# Patient Record
Sex: FEMALE | Race: WHITE | NOT HISPANIC OR LATINO | Employment: FULL TIME | URBAN - METROPOLITAN AREA
[De-identification: names, ages, dates, MRNs, and addresses within clinical notes are randomized per-mention and may not be internally consistent; named-entity substitution may affect disease eponyms.]

---

## 2020-02-13 ENCOUNTER — LAB (OUTPATIENT)
Dept: LAB | Facility: HOSPITAL | Age: 30
End: 2020-02-13

## 2020-02-13 ENCOUNTER — OFFICE VISIT (OUTPATIENT)
Dept: INTERNAL MEDICINE | Facility: CLINIC | Age: 30
End: 2020-02-13

## 2020-02-13 VITALS
SYSTOLIC BLOOD PRESSURE: 140 MMHG | OXYGEN SATURATION: 98 % | RESPIRATION RATE: 16 BRPM | HEART RATE: 87 BPM | WEIGHT: 253.8 LBS | BODY MASS INDEX: 40.79 KG/M2 | TEMPERATURE: 98.4 F | HEIGHT: 66 IN | DIASTOLIC BLOOD PRESSURE: 84 MMHG

## 2020-02-13 DIAGNOSIS — Z00.00 HEALTHCARE MAINTENANCE: ICD-10-CM

## 2020-02-13 DIAGNOSIS — Z13.1 SCREENING FOR DIABETES MELLITUS: ICD-10-CM

## 2020-02-13 DIAGNOSIS — Z13.220 SCREENING, LIPID: ICD-10-CM

## 2020-02-13 DIAGNOSIS — R05.3 CHRONIC COUGH: ICD-10-CM

## 2020-02-13 DIAGNOSIS — F41.9 ANXIETY: Primary | ICD-10-CM

## 2020-02-13 DIAGNOSIS — F33.1 MODERATE EPISODE OF RECURRENT MAJOR DEPRESSIVE DISORDER (HCC): ICD-10-CM

## 2020-02-13 DIAGNOSIS — N64.52 BREAST DISCHARGE: ICD-10-CM

## 2020-02-13 DIAGNOSIS — Z23 NEED FOR VACCINATION: ICD-10-CM

## 2020-02-13 PROBLEM — R63.4 DECREASED BODY WEIGHT: Status: ACTIVE | Noted: 2020-02-13

## 2020-02-13 PROBLEM — IMO0001 ASIAN FLU: Status: ACTIVE | Noted: 2020-02-13

## 2020-02-13 PROBLEM — R63.4 DECREASED BODY WEIGHT: Status: RESOLVED | Noted: 2020-02-13 | Resolved: 2020-02-13

## 2020-02-13 PROCEDURE — 90472 IMMUNIZATION ADMIN EACH ADD: CPT | Performed by: INTERNAL MEDICINE

## 2020-02-13 PROCEDURE — 84146 ASSAY OF PROLACTIN: CPT | Performed by: INTERNAL MEDICINE

## 2020-02-13 PROCEDURE — 85027 COMPLETE CBC AUTOMATED: CPT | Performed by: INTERNAL MEDICINE

## 2020-02-13 PROCEDURE — 83036 HEMOGLOBIN GLYCOSYLATED A1C: CPT | Performed by: INTERNAL MEDICINE

## 2020-02-13 PROCEDURE — 99204 OFFICE O/P NEW MOD 45 MIN: CPT | Performed by: INTERNAL MEDICINE

## 2020-02-13 PROCEDURE — 80061 LIPID PANEL: CPT | Performed by: INTERNAL MEDICINE

## 2020-02-13 PROCEDURE — 90674 CCIIV4 VAC NO PRSV 0.5 ML IM: CPT | Performed by: INTERNAL MEDICINE

## 2020-02-13 PROCEDURE — 90715 TDAP VACCINE 7 YRS/> IM: CPT | Performed by: INTERNAL MEDICINE

## 2020-02-13 PROCEDURE — 80053 COMPREHEN METABOLIC PANEL: CPT | Performed by: INTERNAL MEDICINE

## 2020-02-13 PROCEDURE — 90471 IMMUNIZATION ADMIN: CPT | Performed by: INTERNAL MEDICINE

## 2020-02-13 PROCEDURE — 84443 ASSAY THYROID STIM HORMONE: CPT | Performed by: INTERNAL MEDICINE

## 2020-02-13 RX ORDER — BUPROPION HYDROCHLORIDE 150 MG/1
150 TABLET ORAL DAILY
Qty: 30 TABLET | Refills: 1 | Status: SHIPPED | OUTPATIENT
Start: 2020-02-13 | End: 2020-06-25 | Stop reason: SINTOL

## 2020-02-13 RX ORDER — DULOXETIN HYDROCHLORIDE 60 MG/1
60 CAPSULE, DELAYED RELEASE ORAL DAILY
Qty: 30 CAPSULE | Refills: 2 | Status: SHIPPED | OUTPATIENT
Start: 2020-02-13 | End: 2020-06-25 | Stop reason: SDUPTHER

## 2020-02-13 RX ORDER — DULOXETIN HYDROCHLORIDE 60 MG/1
60 CAPSULE, DELAYED RELEASE ORAL DAILY
COMMUNITY
End: 2020-02-13 | Stop reason: SDUPTHER

## 2020-02-13 NOTE — PROGRESS NOTES
Internal Medicine New Patient  Arminda Alexis is a 29 y.o. female who presents today to establish care and with concerns as outlined below.    Chief Complaint  Chief Complaint   Patient presents with   • Establish Care     Lft breast leaking white/yellow fluid, in shower   • Depression   • Cough     about a month        HPI  Ms. Alexis comes in today to establish care. She has not had a recent PCP but had been seeing Jamie Gannon at [facility] and Dr. Cates at [facility]. Last was seen there in 2017.    She notes 1 month of cough. The cough is described as dry and hacking. She does not note anything that brings it on. It is not exertional. She worries that she has bronchitis. No SOA.     She has been on duloxetine 60mg daily that she takes for anxiety and depression. She previously took wellbutrin 150mg daily but weaned off of this prior to stopping the duloxetine because it did not help. Also took prozac but this did not work. She also previously had done counseling but lost insurance. She notes that her anxiety manifests mainly as anger.    She notes that her left breast has always been larger but is growing in size and has had leakage of a whitish fluid from her left nipple. She has not noticed lumps, bumps, or breast pain. She did have an ultrasound in 2015 for a lump that was felt to be benign. Her mother had breast cancer that was diagnosed at 50.       Review of Systems  Review of Systems   Constitutional: Negative.    HENT: Positive for congestion.    Eyes: Negative.    Respiratory: Positive for cough. Negative for shortness of breath.    Cardiovascular: Negative.    Gastrointestinal: Positive for diarrhea. Negative for abdominal pain, blood in stool, constipation, nausea and vomiting.   Genitourinary: Positive for breast discharge and breast pain. Negative for breast lump, decreased urine volume, difficulty urinating, dysuria, frequency, hematuria and urgency.   Musculoskeletal: Negative.   "  Skin: Negative.    Neurological: Negative.    Psychiatric/Behavioral: Positive for sleep disturbance and depressed mood. Negative for decreased concentration, self-injury and suicidal ideas. The patient is nervous/anxious.         Past Medical History  Past Medical History:   Diagnosis Date   • Depression    • Headache         Surgical History  Past Surgical History:   Procedure Laterality Date   • TONSILLECTOMY     • WISDOM TOOTH EXTRACTION Bilateral     July 2019        Family History  Family History   Problem Relation Age of Onset   • Cancer Mother    • Diabetes Father    • Diabetes Maternal Grandmother    • Diabetes Maternal Grandfather    • Diabetes Paternal Grandmother    • Diabetes Paternal Grandfather    • Cancer Paternal Grandfather    • Brain cancer Paternal Grandfather         Social History  Social History     Socioeconomic History   • Marital status:      Spouse name: Not on file   • Number of children: Not on file   • Years of education: Not on file   • Highest education level: Not on file   Tobacco Use   • Smoking status: Former Smoker   • Smokeless tobacco: Never Used   • Tobacco comment: 2016 quit   Substance and Sexual Activity   • Alcohol use: Never     Frequency: Never   • Drug use: Never        Current Medications  Current Outpatient Medications on File Prior to Visit   Medication Sig Dispense Refill   • DULoxetine (CYMBALTA) 60 MG capsule Take 60 mg by mouth Daily.       No current facility-administered medications on file prior to visit.        Allergies  No Known Allergies     Objective  Visit Vitals  /84   Pulse 87   Temp 98.4 °F (36.9 °C)   Resp 16   Ht 167.6 cm (65.98\")   Wt 115 kg (253 lb 12.8 oz)   SpO2 98%   BMI 40.98 kg/m²        Physical Exam  Physical Exam   Constitutional: She is oriented to person, place, and time. She appears well-developed and well-nourished. No distress. She is obese.  HENT:   Head: Normocephalic and atraumatic.   Right Ear: External ear normal. "   Left Ear: External ear normal.   Nose: Nose normal.   Mouth/Throat: Posterior oropharyngeal edema (mild) and posterior oropharyngeal erythema present. No oropharyngeal exudate.   Eyes: Pupils are equal, round, and reactive to light. Conjunctivae and EOM are normal. No scleral icterus.   Neck: Neck supple.   Cardiovascular: Normal rate, regular rhythm and normal heart sounds.   No murmur heard.  Pulmonary/Chest: Effort normal and breath sounds normal. No respiratory distress. Right breast exhibits no inverted nipple, no mass, no nipple discharge, no skin change and no tenderness. Left breast exhibits mass (small palpable mass at 8:00 with associated tenderness) and tenderness. Left breast exhibits no inverted nipple, no nipple discharge and no skin change.   Abdominal: Soft. Bowel sounds are normal. She exhibits no distension. There is no tenderness.   Musculoskeletal: She exhibits no edema or deformity.   Lymphadenopathy:     She has no cervical adenopathy.   Neurological: She is alert and oriented to person, place, and time.   Skin: Skin is warm and dry. No rash noted. She is not diaphoretic.   Psychiatric: She has a normal mood and affect. Her behavior is normal. Judgment and thought content normal.   Nursing note and vitals reviewed.     Results  No results found for this or any previous visit.     Assessment and Plan  Arminda was seen today for establish care, depression and cough.    Diagnoses and all orders for this visit:    Anxiety and moderate episode of MDD  - On cymbalta 60mg daily for the last year with ongoing issues with anxiety, irritability, depression.  - Discussed options of adding wellbutrin to cymbalta or changing to another SSRI, she has had some relief with cymbalta so opted for addition of wellbutrin. Will refill cymbalta 60mg daily and add wellbutrin 150mg daily. Discussed common side effects and that full effect may not be noted for 4-6 weeks. She is to notify me if she develops side  effects.  - Also recommended counseling and provided her a handout of local providers.  - Will reevaluate in 4 weeks.    Breast discharge  - She reports unilateral left breast discharge. Has breast tenderness and possible small mass versus fibroglandular tissue on exam. No expressed discharge.  - She does have a family history of breast cancer in her mother at age 50.  - Will check prolactin, TSH, renal function, and obtain breast ultrasound.  - If above workup negative it may be due to duloxetine which was discussed with her today.    Chronic cough  - Suspect post nasal drainage, recommend flonase.  - No SOA, hypoxia, or abnormal lung sounds on exam, also clinically not consistent with asthma as it is not exertional.    Healthcare maintenance  - CBC and CMP ordered    Screening, lipid  - Lipid panel ordered    Screening for diabetes mellitus  - A1c ordered    Health Maintenance   Topic Date Due   • ANNUAL PHYSICAL  05/05/1993   • TDAP/TD VACCINES (1 - Tdap) 05/05/2001   • INFLUENZA VACCINE  08/01/2019   • PAP SMEAR  02/13/2020     Health Maintenance  - Pap smear: Last at [facility] in 2017, normal.  - Mammogram: Start screening at age 40.  - Colonoscopy: Start screening at age 50.  - Immunizations: Flu and Tdap today.  - Depression screening: PHQ9 = 11 2/2020 addressed above.    Return in about 4 weeks (around 3/12/2020) for Follow up, Labs today.

## 2020-02-14 LAB
ALBUMIN SERPL-MCNC: 4.5 G/DL (ref 3.5–5.2)
ALBUMIN/GLOB SERPL: 1.3 G/DL
ALP SERPL-CCNC: 58 U/L (ref 39–117)
ALT SERPL W P-5'-P-CCNC: 30 U/L (ref 1–33)
ANION GAP SERPL CALCULATED.3IONS-SCNC: 12.1 MMOL/L (ref 5–15)
AST SERPL-CCNC: 24 U/L (ref 1–32)
BILIRUB SERPL-MCNC: 0.3 MG/DL (ref 0.2–1.2)
BUN BLD-MCNC: 11 MG/DL (ref 6–20)
BUN/CREAT SERPL: 16.2 (ref 7–25)
CALCIUM SPEC-SCNC: 9.8 MG/DL (ref 8.6–10.5)
CHLORIDE SERPL-SCNC: 100 MMOL/L (ref 98–107)
CHOLEST SERPL-MCNC: 202 MG/DL (ref 0–200)
CO2 SERPL-SCNC: 24.9 MMOL/L (ref 22–29)
CREAT BLD-MCNC: 0.68 MG/DL (ref 0.57–1)
DEPRECATED RDW RBC AUTO: 43.8 FL (ref 37–54)
ERYTHROCYTE [DISTWIDTH] IN BLOOD BY AUTOMATED COUNT: 14.6 % (ref 12.3–15.4)
GFR SERPL CREATININE-BSD FRML MDRD: 102 ML/MIN/1.73
GLOBULIN UR ELPH-MCNC: 3.4 GM/DL
GLUCOSE BLD-MCNC: 81 MG/DL (ref 65–99)
HBA1C MFR BLD: 5.6 % (ref 4.8–5.6)
HCT VFR BLD AUTO: 43.6 % (ref 34–46.6)
HDLC SERPL-MCNC: 41 MG/DL (ref 40–60)
HGB BLD-MCNC: 14.7 G/DL (ref 12–15.9)
LDLC SERPL CALC-MCNC: 93 MG/DL (ref 0–100)
LDLC/HDLC SERPL: 2.26 {RATIO}
MCH RBC QN AUTO: 27.8 PG (ref 26.6–33)
MCHC RBC AUTO-ENTMCNC: 33.7 G/DL (ref 31.5–35.7)
MCV RBC AUTO: 82.6 FL (ref 79–97)
PLATELET # BLD AUTO: 303 10*3/MM3 (ref 140–450)
PMV BLD AUTO: 10.3 FL (ref 6–12)
POTASSIUM BLD-SCNC: 4.3 MMOL/L (ref 3.5–5.2)
PROLACTIN SERPL-MCNC: 8.8 NG/ML (ref 4.79–23.3)
PROT SERPL-MCNC: 7.9 G/DL (ref 6–8.5)
RBC # BLD AUTO: 5.28 10*6/MM3 (ref 3.77–5.28)
SODIUM BLD-SCNC: 137 MMOL/L (ref 136–145)
TRIGL SERPL-MCNC: 341 MG/DL (ref 0–150)
TSH SERPL DL<=0.05 MIU/L-ACNC: 2.21 UIU/ML (ref 0.27–4.2)
VLDLC SERPL-MCNC: 68.2 MG/DL (ref 5–40)
WBC NRBC COR # BLD: 10.97 10*3/MM3 (ref 3.4–10.8)

## 2020-02-21 ENCOUNTER — TELEPHONE (OUTPATIENT)
Dept: INTERNAL MEDICINE | Facility: CLINIC | Age: 30
End: 2020-02-21

## 2020-02-21 ENCOUNTER — APPOINTMENT (OUTPATIENT)
Dept: ULTRASOUND IMAGING | Facility: HOSPITAL | Age: 30
End: 2020-02-21

## 2020-02-24 DIAGNOSIS — N60.42 DUCT ECTASIA OF BREAST, LEFT: Primary | ICD-10-CM

## 2020-02-24 DIAGNOSIS — N64.52 BREAST DISCHARGE: ICD-10-CM

## 2020-02-24 NOTE — TELEPHONE ENCOUNTER
I think the orders need to be changed to [facility] and not [facility]?  
Meli, please let me know if there is any issue with getting this set up at [facility]. Thank you!  
PT CALLED BACK AND STATED SHE  ALREADY HAD THIS DONE AT [facility] AND THE RESULTS ARE TO BE COMING OVER TO THE OFFICE.   
PT HAS AN ORDER FOR AN ULTRASOUND, IT WAS SENT TO [facility] BREAST [place],BUT PT WOULD LIKE IT SENT TO [facility]TR[facility], -[phone]  
Yes

## 2020-06-25 ENCOUNTER — OFFICE VISIT (OUTPATIENT)
Dept: INTERNAL MEDICINE | Facility: CLINIC | Age: 30
End: 2020-06-25

## 2020-06-25 VITALS
RESPIRATION RATE: 16 BRPM | SYSTOLIC BLOOD PRESSURE: 138 MMHG | BODY MASS INDEX: 38.63 KG/M2 | HEART RATE: 55 BPM | TEMPERATURE: 98.6 F | DIASTOLIC BLOOD PRESSURE: 72 MMHG | HEIGHT: 66 IN | WEIGHT: 240.4 LBS | OXYGEN SATURATION: 97 %

## 2020-06-25 DIAGNOSIS — F33.1 MODERATE EPISODE OF RECURRENT MAJOR DEPRESSIVE DISORDER (HCC): ICD-10-CM

## 2020-06-25 DIAGNOSIS — F41.9 ANXIETY: Primary | ICD-10-CM

## 2020-06-25 DIAGNOSIS — N60.42 DUCT ECTASIA OF BREAST, LEFT: ICD-10-CM

## 2020-06-25 PROBLEM — R05.3 CHRONIC COUGH: Status: RESOLVED | Noted: 2020-02-13 | Resolved: 2020-06-25

## 2020-06-25 PROCEDURE — 99214 OFFICE O/P EST MOD 30 MIN: CPT | Performed by: INTERNAL MEDICINE

## 2020-06-25 RX ORDER — HYDROXYZINE PAMOATE 25 MG/1
25 CAPSULE ORAL 3 TIMES DAILY PRN
Qty: 30 CAPSULE | Refills: 1 | Status: SHIPPED | OUTPATIENT
Start: 2020-06-25 | End: 2021-01-05

## 2020-06-25 RX ORDER — DULOXETIN HYDROCHLORIDE 30 MG/1
90 CAPSULE, DELAYED RELEASE ORAL DAILY
Qty: 90 CAPSULE | Refills: 1 | Status: SHIPPED | OUTPATIENT
Start: 2020-06-25 | End: 2020-08-21

## 2020-06-25 NOTE — PROGRESS NOTES
Internal Medicine Acute Visit    Chief Complaint   Patient presents with   • Depression     Medication changes,depression worse,family issues        HPI  Ms. Alexis comes in today due to worsening depression. She did not tolerate wellbutrin due to increase in anger which had happened previously when she took wellbutrin. She recently has been having relationship issues with her . She has been having panic attacks associated with palpitations and decreased appetite. She was unable to sleep last night after an argument. At other times she is oversleeping. She did have thoughts once that everything would be better without her. She feels worthless. She did not have active SI, no plan. She has tried exercise, diet, preoccupying herself with other things. She remains on cymbalta 60mg daily. She has been in counseling before but is not currently.    Additionally she notes that previously present breast lump and discharge have resolved. She did not see breast surgeon.       Review of Systems  Review of Systems   Constitutional: Positive for appetite change and fatigue. Negative for fever.   Respiratory: Negative.    Cardiovascular: Positive for palpitations (with panic attacks). Negative for chest pain.   Genitourinary: Negative for breast discharge, breast lump and breast pain.   Psychiatric/Behavioral: Positive for agitation, sleep disturbance, depressed mood and stress. Negative for self-injury and suicidal ideas. The patient is nervous/anxious.         Medications  Current Outpatient Medications on File Prior to Visit   Medication Sig Dispense Refill   • DULoxetine (CYMBALTA) 60 MG capsule Take 1 capsule by mouth Daily. 30 capsule 2   • [DISCONTINUED] buPROPion XL (WELLBUTRIN XL) 150 MG 24 hr tablet Take 1 tablet by mouth Daily. 30 tablet 1     No current facility-administered medications on file prior to visit.         Allergies  Allergies   Allergen Reactions   • Wellbutrin [Bupropion] Irritability  "[facility]  Past Medical History:   Diagnosis Date   • Decreased body weight 2/13/2020   • Depression    • Headache        Objective  Visit Vitals  /72   Pulse 55   Temp 98.6 °F (37 °C)   Resp 16   Ht 167.6 cm (65.98\")   Wt 109 kg (240 lb 6.4 oz)   SpO2 97%   BMI 38.82 kg/m²        Physical Exam  Physical Exam   Constitutional: She is oriented to person, place, and time. She appears well-developed and well-nourished. No distress (emotional). She is obese.  HENT:   Head: Normocephalic and atraumatic.   Eyes: Conjunctivae are normal.   Pulmonary/Chest: Effort normal. No respiratory distress.   Musculoskeletal: She exhibits no edema.   Neurological: She is alert and oriented to person, place, and time.   Skin: Skin is warm and dry.   Psychiatric: Her speech is normal. Judgment normal. Her mood appears anxious. She is withdrawn. She exhibits a depressed mood.   Nursing note and vitals reviewed.      Results  Results for orders placed or performed in visit on 02/13/20   Prolactin   Result Value Ref Range    Prolactin 8.80 4.79 - 23.30 ng/mL   CBC (No Diff)   Result Value Ref Range    WBC 10.97 (H) 3.40 - 10.80 10*3/mm3    RBC 5.28 3.77 - 5.28 10*6/mm3    Hemoglobin 14.7 12.0 - 15.9 g/dL    Hematocrit 43.6 34.0 - 46.6 %    MCV 82.6 79.0 - 97.0 fL    MCH 27.8 26.6 - 33.0 pg    MCHC 33.7 31.5 - 35.7 g/dL    RDW 14.6 12.3 - 15.4 %    RDW-SD 43.8 37.0 - 54.0 fl    MPV 10.3 6.0 - 12.0 fL    Platelets 303 140 - 450 10*3/mm3   Comprehensive Metabolic Panel   Result Value Ref Range    Glucose 81 65 - 99 mg/dL    BUN 11 6 - 20 mg/dL    Creatinine 0.68 0.57 - 1.00 mg/dL    Sodium 137 136 - 145 mmol/L    Potassium 4.3 3.5 - 5.2 mmol/L    Chloride 100 98 - 107 mmol/L    CO2 24.9 22.0 - 29.0 mmol/L    Calcium 9.8 8.6 - 10.5 mg/dL    Total Protein 7.9 6.0 - 8.5 g/dL    Albumin 4.50 3.50 - 5.20 g/dL    ALT (SGPT) 30 1 - 33 U/L    AST (SGOT) 24 1 - 32 U/L    Alkaline Phosphatase 58 39 - 117 U/L    Total Bilirubin 0.3 0.2 - 1.2 mg/dL "    eGFR Non African Amer 102 >60 mL/min/1.73    Globulin 3.4 gm/dL    A/G Ratio 1.3 g/dL    BUN/Creatinine Ratio 16.2 7.0 - 25.0    Anion Gap 12.1 5.0 - 15.0 mmol/L   Hemoglobin A1c   Result Value Ref Range    Hemoglobin A1C 5.60 4.80 - 5.60 %   Lipid Panel   Result Value Ref Range    Total Cholesterol 202 (H) 0 - 200 mg/dL    Triglycerides 341 (H) 0 - 150 mg/dL    HDL Cholesterol 41 40 - 60 mg/dL    LDL Cholesterol  93 0 - 100 mg/dL    VLDL Cholesterol 68.2 (H) 5 - 40 mg/dL    LDL/HDL Ratio 2.26    TSH Rfx On Abnormal To Free T4   Result Value Ref Range    TSH 2.210 0.270 - 4.200 uIU/mL        Assessment and Plan  Arminda was seen today for depression.    Diagnoses and all orders for this visit:    Anxiety and moderate episode of MDD  - On cymbalta 60mg daily with increase in anxiety, irritability, depression due to relationship strain between her and her .  - Did not tolerate wellbutrin.  - Discussed options of increasing cymbalta or changing to another SSRI, she has had some relief with cymbalta so opted to increase dose. Will increase cymbalta to 90mg daily and add vistaril 25mg TID PRN anxiety. Discussed common side effects and that full effect may not be noted for 4-6 weeks. She is to notify me if she develops side effects.  - Also recommended counseling and provided her a handout of local providers    Duct ectasia of breast, left  - Had left breast ultrasound 2/2020 to investigate unilateral breast discharge, tenderness, possible small lump. US with duct ectasia.  - She does have a family history of breast cancer in her mother at age 50.  - She had been referred to breast surgery for evaluation however did not keep appt. Advised today that even though symptoms have resolved she needs to reschedule this when able.    Health Maintenance  - Pap smear: Last at [facility] in 2017, normal.  - Mammogram: Start screening at age 40.  - Colonoscopy: Start screening at age 45-50.  - Immunizations:  Tdap 2/2020.  - Depression screening: PHQ9 = 11 2/2020 addressed above.    Return in about 4 weeks (around 7/23/2020) for Follow up depression, anxiety, weight loss 30 minutes please.     Answers for HPI/ROS submitted by the patient on 6/25/2020   What is the primary reason for your visit?: Other  Please describe your symptoms.: Depression is worsening  Have you had these symptoms before?: Yes  How long have you been having these symptoms?: Greater than 2 weeks  Please list any medications you are currently taking for this condition.: Generic Cymbalta 60mg/day

## 2020-06-26 ENCOUNTER — PRIOR AUTHORIZATION (OUTPATIENT)
Dept: INTERNAL MEDICINE | Facility: CLINIC | Age: 30
End: 2020-06-26

## 2020-06-26 NOTE — TELEPHONE ENCOUNTER
Resent with new information  The PA stated the recommented dose is only 60mg daily, I felicia she takes 3 30 mg daily

## 2020-06-26 NOTE — TELEPHONE ENCOUNTER
So it was denied again due to dose? Max dose can be up to 120mg daily but typical dose is 60mg daily.

## 2020-06-26 NOTE — TELEPHONE ENCOUNTER
She has tried wellbutrin and prozac. Wellbutrin worsened mood and prozac did not work. Could you try to resubmit? Thank you!

## 2020-07-16 ENCOUNTER — OFFICE VISIT (OUTPATIENT)
Dept: INTERNAL MEDICINE | Facility: CLINIC | Age: 30
End: 2020-07-16

## 2020-07-16 VITALS
DIASTOLIC BLOOD PRESSURE: 80 MMHG | TEMPERATURE: 97.7 F | OXYGEN SATURATION: 98 % | BODY MASS INDEX: 37.77 KG/M2 | WEIGHT: 235 LBS | RESPIRATION RATE: 16 BRPM | HEIGHT: 66 IN | HEART RATE: 68 BPM | SYSTOLIC BLOOD PRESSURE: 142 MMHG

## 2020-07-16 DIAGNOSIS — E66.09 CLASS 2 OBESITY DUE TO EXCESS CALORIES WITHOUT SERIOUS COMORBIDITY WITH BODY MASS INDEX (BMI) OF 37.0 TO 37.9 IN ADULT: ICD-10-CM

## 2020-07-16 DIAGNOSIS — F33.1 MODERATE EPISODE OF RECURRENT MAJOR DEPRESSIVE DISORDER (HCC): Primary | ICD-10-CM

## 2020-07-16 DIAGNOSIS — F41.9 ANXIETY: ICD-10-CM

## 2020-07-16 PROCEDURE — 99213 OFFICE O/P EST LOW 20 MIN: CPT | Performed by: INTERNAL MEDICINE

## 2020-07-16 RX ORDER — DULOXETIN HYDROCHLORIDE 60 MG/1
60 CAPSULE, DELAYED RELEASE ORAL DAILY
Qty: 30 CAPSULE | Refills: 2 | Status: SHIPPED | OUTPATIENT
Start: 2020-07-16 | End: 2020-09-02 | Stop reason: SDUPTHER

## 2020-07-16 RX ORDER — DULOXETIN HYDROCHLORIDE 60 MG/1
60 CAPSULE, DELAYED RELEASE ORAL DAILY
COMMUNITY
End: 2020-07-16 | Stop reason: SDUPTHER

## 2020-07-16 NOTE — PROGRESS NOTES
"Internal Medicine Follow Up    Chief Complaint  Arminda Alexis is a 30 y.o. female who presents today for follow up of chronic medical conditions outlined below.    Chief Complaint   Patient presents with   • Anxiety   • Depression   • Weight Check        HPI  Ms. Alexis comes in today for follow up. She reports mood has been okay, still has bad days where she will cry but overall better. No SI. She is getting . She will see a counselor next week. She is doing weight watchers and exercising daily on lunch break. She is losing weight consistently, down another 5lbs today. She is interested in increasing her weight loss with saxenda. Unable to use contrave due to intolerance to wellbutrin.       Review of Systems  Review of Systems   Constitutional: Negative for fever.        +weight loss due to diet, exercise   Respiratory: Negative for shortness of breath.    Cardiovascular: Negative for chest pain.   Psychiatric/Behavioral: Positive for depressed mood and stress. Negative for suicidal ideas.        Current Medications  Current Outpatient Medications on File Prior to Visit   Medication Sig Dispense Refill   • DULoxetine (CYMBALTA) 30 MG capsule Take 3 capsules by mouth Daily. 90 capsule 1   • hydrOXYzine pamoate (Vistaril) 25 MG capsule Take 1 capsule by mouth 3 (Three) Times a Day As Needed for Anxiety. 30 capsule 1   • [DISCONTINUED] DULoxetine (CYMBALTA) 60 MG capsule Take 60 mg by mouth Daily.       No current facility-administered medications on file prior to visit.        Allergies  Allergies   Allergen Reactions   • Wellbutrin [Bupropion] Irritability       Objective  Visit Vitals  /80   Pulse 68   Temp 97.7 °F (36.5 °C)   Resp 16   Ht 167.6 cm (65.98\")   Wt 107 kg (235 lb)   SpO2 98%   BMI 37.95 kg/m²        Physical Exam  Physical Exam   Constitutional: She appears well-developed and well-nourished. No distress. She is obese.  HENT:   Head: Normocephalic and atraumatic.   Right Ear: External ear " normal.   Left Ear: External ear normal.   Eyes: Conjunctivae are normal.   Pulmonary/Chest: Effort normal. No respiratory distress.   Musculoskeletal: She exhibits no edema.   Neurological: She is alert.   Skin: Skin is warm and dry.   Psychiatric: She has a normal mood and affect. Her behavior is normal. Judgment and thought content normal.   Nursing note and vitals reviewed.      Results  Results for orders placed or performed in visit on 02/13/20   Prolactin   Result Value Ref Range    Prolactin 8.80 4.79 - 23.30 ng/mL   CBC (No Diff)   Result Value Ref Range    WBC 10.97 (H) 3.40 - 10.80 10*3/mm3    RBC 5.28 3.77 - 5.28 10*6/mm3    Hemoglobin 14.7 12.0 - 15.9 g/dL    Hematocrit 43.6 34.0 - 46.6 %    MCV 82.6 79.0 - 97.0 fL    MCH 27.8 26.6 - 33.0 pg    MCHC 33.7 31.5 - 35.7 g/dL    RDW 14.6 12.3 - 15.4 %    RDW-SD 43.8 37.0 - 54.0 fl    MPV 10.3 6.0 - 12.0 fL    Platelets 303 140 - 450 10*3/mm3   Comprehensive Metabolic Panel   Result Value Ref Range    Glucose 81 65 - 99 mg/dL    BUN 11 6 - 20 mg/dL    Creatinine 0.68 0.57 - 1.00 mg/dL    Sodium 137 136 - 145 mmol/L    Potassium 4.3 3.5 - 5.2 mmol/L    Chloride 100 98 - 107 mmol/L    CO2 24.9 22.0 - 29.0 mmol/L    Calcium 9.8 8.6 - 10.5 mg/dL    Total Protein 7.9 6.0 - 8.5 g/dL    Albumin 4.50 3.50 - 5.20 g/dL    ALT (SGPT) 30 1 - 33 U/L    AST (SGOT) 24 1 - 32 U/L    Alkaline Phosphatase 58 39 - 117 U/L    Total Bilirubin 0.3 0.2 - 1.2 mg/dL    eGFR Non African Amer 102 >60 mL/min/1.73    Globulin 3.4 gm/dL    A/G Ratio 1.3 g/dL    BUN/Creatinine Ratio 16.2 7.0 - 25.0    Anion Gap 12.1 5.0 - 15.0 mmol/L   Hemoglobin A1c   Result Value Ref Range    Hemoglobin A1C 5.60 4.80 - 5.60 %   Lipid Panel   Result Value Ref Range    Total Cholesterol 202 (H) 0 - 200 mg/dL    Triglycerides 341 (H) 0 - 150 mg/dL    HDL Cholesterol 41 40 - 60 mg/dL    LDL Cholesterol  93 0 - 100 mg/dL    VLDL Cholesterol 68.2 (H) 5 - 40 mg/dL    LDL/HDL Ratio 2.26    TSH Rfx On Abnormal To  Free T4   Result Value Ref Range    TSH 2.210 0.270 - 4.200 uIU/mL        Assessment and Plan  Arminda was seen today for anxiety, depression and weight check.    Diagnoses and all orders for this visit:    Anxiety and moderate episode of MDD  - On cymbalta 90mg daily with improvement in anxiety, irritability, depression. No SI. Will be starting counseling next week as well.    Class 2 obesity due to excess calories without serious comorbidity with body mass index (BMI) of 37.0 to 37.9 in adult  - Has been on weight watchers, exercising daily during lunch break and has consistently lost weight during her last several visits. Weight today 235lbs which is down from 253lbs in December and 240lbs at last visit in June.  - She is interested in starting a medication to assist her weight loss. Unable to take contrave due to wellbutrin intolerance. Discussed saxenda today and this was sent to her pharmacy.  - Will follow up her weight loss progress in 2 months.    Health Maintenance  - Pap smear: Last at [facility] in 2017, normal.  - Mammogram: Start screening at age 40.  - Colonoscopy: Start screening at age 45-50.  - Immunizations: Tdap 2/2020.  - Depression screening: PHQ9 = 11 2/2020 addressed above.       Return in about 2 months (around 9/16/2020) for Follow up weight loss.  Answers for HPI/ROS submitted by the patient on 7/9/2020   What is the primary reason for your visit?: Other  Please describe your symptoms.: Depression and anxiety were worsening.  Have you had these symptoms before?: Yes  How long have you been having these symptoms?: Greater than 2 weeks  Please list any medications you are currently taking for this condition.: Cymbalta - 90mg/day, antianxiety medication - very infrequently but i don't remember the name  Please describe any probable cause for these symptoms. : My soon-to-be ex- and I have decided to get a divorce.

## 2020-07-20 PROBLEM — N60.42 DUCT ECTASIA OF BREAST, LEFT: Status: RESOLVED | Noted: 2020-02-13 | Resolved: 2020-07-20

## 2020-08-11 ENCOUNTER — TELEPHONE (OUTPATIENT)
Dept: INTERNAL MEDICINE | Facility: CLINIC | Age: 30
End: 2020-08-11

## 2020-08-21 DIAGNOSIS — F41.9 ANXIETY: ICD-10-CM

## 2020-08-21 DIAGNOSIS — F33.1 MODERATE EPISODE OF RECURRENT MAJOR DEPRESSIVE DISORDER (HCC): ICD-10-CM

## 2020-08-21 RX ORDER — DULOXETIN HYDROCHLORIDE 30 MG/1
CAPSULE, DELAYED RELEASE ORAL
Qty: 30 CAPSULE | Refills: 0 | Status: SHIPPED | OUTPATIENT
Start: 2020-08-21 | End: 2020-09-02 | Stop reason: SDUPTHER

## 2020-09-02 ENCOUNTER — OFFICE VISIT (OUTPATIENT)
Dept: INTERNAL MEDICINE | Facility: CLINIC | Age: 30
End: 2020-09-02

## 2020-09-02 VITALS
HEART RATE: 71 BPM | RESPIRATION RATE: 16 BRPM | OXYGEN SATURATION: 98 % | SYSTOLIC BLOOD PRESSURE: 142 MMHG | TEMPERATURE: 97.8 F | WEIGHT: 222 LBS | HEIGHT: 66 IN | DIASTOLIC BLOOD PRESSURE: 82 MMHG | BODY MASS INDEX: 35.68 KG/M2

## 2020-09-02 DIAGNOSIS — Z30.09 BIRTH CONTROL COUNSELING: ICD-10-CM

## 2020-09-02 DIAGNOSIS — F33.1 MODERATE EPISODE OF RECURRENT MAJOR DEPRESSIVE DISORDER (HCC): ICD-10-CM

## 2020-09-02 DIAGNOSIS — E66.09 CLASS 2 OBESITY DUE TO EXCESS CALORIES WITHOUT SERIOUS COMORBIDITY WITH BODY MASS INDEX (BMI) OF 35.0 TO 35.9 IN ADULT: ICD-10-CM

## 2020-09-02 DIAGNOSIS — Z00.00 ANNUAL PHYSICAL EXAM: Primary | ICD-10-CM

## 2020-09-02 DIAGNOSIS — F41.9 ANXIETY: ICD-10-CM

## 2020-09-02 DIAGNOSIS — Z01.419 WELL WOMAN EXAM WITH ROUTINE GYNECOLOGICAL EXAM: ICD-10-CM

## 2020-09-02 PROBLEM — E66.812 CLASS 2 OBESITY DUE TO EXCESS CALORIES WITHOUT SERIOUS COMORBIDITY WITH BODY MASS INDEX (BMI) OF 35.0 TO 35.9 IN ADULT: Status: ACTIVE | Noted: 2020-09-02

## 2020-09-02 PROCEDURE — 99395 PREV VISIT EST AGE 18-39: CPT | Performed by: INTERNAL MEDICINE

## 2020-09-02 RX ORDER — DULOXETIN HYDROCHLORIDE 30 MG/1
30 CAPSULE, DELAYED RELEASE ORAL DAILY
Qty: 30 CAPSULE | Refills: 11 | Status: SHIPPED | OUTPATIENT
Start: 2020-09-02 | End: 2021-09-08 | Stop reason: SDUPTHER

## 2020-09-02 RX ORDER — DULOXETIN HYDROCHLORIDE 60 MG/1
60 CAPSULE, DELAYED RELEASE ORAL DAILY
Qty: 30 CAPSULE | Refills: 11 | Status: SHIPPED | OUTPATIENT
Start: 2020-09-02 | End: 2021-09-08 | Stop reason: SDUPTHER

## 2020-09-02 NOTE — PROGRESS NOTES
Internal Medicine Annual Exam  Arminda Alexis is a 30 y.o. female who presents today for an annual exam and with concerns as outlined below.    Chief Complaint  Chief Complaint   Patient presents with   • Annual Exam        HPI  She continues to work on weight loss. She has lost an additional 13lbs. Walking and will be working on couch to 5K. Saxenda not covered by insurance. Cut out fast food, processed foods. Eating whole foods and cooking at home, protein shakes for snacks. First weight loss goal is 200lbs by Jan 1. Mood remains stable on cymbalta 90mg daily and has used vistaril about 3 times, no panic attacks.     She uses sunscreen daily, wears seatbelts. Has about 1 drink weekly. Quit smoking in 2016. No drug use. Vision and dental exams UTD.    She menstruates regularly about every 28-30 days. No heavy menstruation or excessive pain. She has two kids, both boys with oldest being 7 and youngest is 3. Currently sexually active with a single male partner. Using condoms. No concern for STIs. No vaginal itching, pain, discharge. No breast issues and has never had a mammogram. Mom had breast cancer at 50. She does self breast exams.     Review of Systems  Review of Systems   Constitutional: Negative.    Eyes: Negative.    Respiratory: Negative.    Cardiovascular: Negative.    Gastrointestinal: Negative.    Genitourinary: Negative.    Musculoskeletal: Negative.    Skin: Negative.    Neurological: Negative.    Psychiatric/Behavioral: Negative.         Past Medical History  Past Medical History:   Diagnosis Date   • Decreased body weight 2/13/2020   • Depression    • Headache         Surgical History  Past Surgical History:   Procedure Laterality Date   • TONSILLECTOMY     • WISDOM TOOTH EXTRACTION Bilateral     July 2019        Family History  Family History   Problem Relation Age of Onset   • Breast cancer Mother 50        s/p double mastectomy   • Depression Mother    • Anxiety disorder Mother    • Diabetes Father   "  • Diabetes Maternal Grandmother    • Diabetes Maternal Grandfather    • Brain cancer Maternal Grandfather    • Diabetes Paternal Grandmother    • Diabetes Paternal Grandfather    • Depression Sister    • Anxiety disorder Sister    • Liver disease Half-Brother         fatty liver        Social History  Social History     Socioeconomic History   • Marital status:      Spouse name: Not on file   • Number of children: Not on file   • Years of education: Not on file   • Highest education level: Not on file   Tobacco Use   • Smoking status: Former Smoker     Packs/day: 0.50     Years: 7.00     Pack years: 3.50     Types: Cigarettes     Last attempt to quit: 2016     Years since quittin.0   • Smokeless tobacco: Never Used   • Tobacco comment: 2016 quit   Substance and Sexual Activity   • Alcohol use: Never     Frequency: Never   • Drug use: Never        Current Medications  Current Outpatient Medications on File Prior to Visit   Medication Sig Dispense Refill   • DULoxetine (CYMBALTA) 30 MG capsule TAKE ONE CAPSULE BY MOUTH ONCE DAILY ALONG WITH A 60MG CAPSULE (TOTAL DAILY DOSE 90 MG) 30 capsule 0   • DULoxetine (CYMBALTA) 60 MG capsule Take 1 capsule by mouth Daily. Take with 30mg to make total of 90mg daily. 30 capsule 2   • hydrOXYzine pamoate (Vistaril) 25 MG capsule Take 1 capsule by mouth 3 (Three) Times a Day As Needed for Anxiety. 30 capsule 1   • Liraglutide -Weight Management (Saxenda) 18 MG/3ML solution pen-injector Inject 0.6 mg under the skin into the appropriate area as directed Daily. Increase by 0.6 mg each week to a max dose of 3 mg daily 5 pen 5     No current facility-administered medications on file prior to visit.        Allergies  Allergies   Allergen Reactions   • Wellbutrin [Bupropion] Irritability        Objective  Visit Vitals  /82   Pulse 71   Temp 97.8 °F (36.6 °C)   Resp 16   Ht 167.6 cm (65.98\")   Wt 101 kg (222 lb)   SpO2 98%   BMI 35.85 kg/m²        Physical " Exam  Physical Exam   Constitutional: She is oriented to person, place, and time. She appears well-developed and well-nourished. No distress.   HENT:   Head: Normocephalic and atraumatic.   Right Ear: External ear normal.   Left Ear: External ear normal.   Nose: Nose normal.   Mouth/Throat: Oropharynx is clear and moist. No oropharyngeal exudate.   Eyes: Pupils are equal, round, and reactive to light. Conjunctivae and EOM are normal. No scleral icterus.   Neck: Neck supple.   Cardiovascular: Normal rate, regular rhythm and normal heart sounds.   No murmur heard.  Pulmonary/Chest: Effort normal and breath sounds normal. No respiratory distress.   Abdominal: Soft. Bowel sounds are normal. She exhibits no distension. There is no tenderness.   Genitourinary: Cervix normal. Pelvic exam was performed with patient prone. Right adnexum is palpable.Left adnexum is palpable.Vaginal discharge (thin, white) found.   Musculoskeletal: She exhibits no edema or deformity.   Lymphadenopathy:     She has no cervical adenopathy.   Neurological: She is alert and oriented to person, place, and time.   Skin: Skin is warm and dry. No rash noted. She is not diaphoretic.   Psychiatric: She has a normal mood and affect. Her behavior is normal. Judgment and thought content normal.   Nursing note and vitals reviewed.       Results  Results for orders placed or performed in visit on 02/13/20   Prolactin   Result Value Ref Range    Prolactin 8.80 4.79 - 23.30 ng/mL   CBC (No Diff)   Result Value Ref Range    WBC 10.97 (H) 3.40 - 10.80 10*3/mm3    RBC 5.28 3.77 - 5.28 10*6/mm3    Hemoglobin 14.7 12.0 - 15.9 g/dL    Hematocrit 43.6 34.0 - 46.6 %    MCV 82.6 79.0 - 97.0 fL    MCH 27.8 26.6 - 33.0 pg    MCHC 33.7 31.5 - 35.7 g/dL    RDW 14.6 12.3 - 15.4 %    RDW-SD 43.8 37.0 - 54.0 fl    MPV 10.3 6.0 - 12.0 fL    Platelets 303 140 - 450 10*3/mm3   Comprehensive Metabolic Panel   Result Value Ref Range    Glucose 81 65 - 99 mg/dL    BUN 11 6 - 20  mg/dL    Creatinine 0.68 0.57 - 1.00 mg/dL    Sodium 137 136 - 145 mmol/L    Potassium 4.3 3.5 - 5.2 mmol/L    Chloride 100 98 - 107 mmol/L    CO2 24.9 22.0 - 29.0 mmol/L    Calcium 9.8 8.6 - 10.5 mg/dL    Total Protein 7.9 6.0 - 8.5 g/dL    Albumin 4.50 3.50 - 5.20 g/dL    ALT (SGPT) 30 1 - 33 U/L    AST (SGOT) 24 1 - 32 U/L    Alkaline Phosphatase 58 39 - 117 U/L    Total Bilirubin 0.3 0.2 - 1.2 mg/dL    eGFR Non African Amer 102 >60 mL/min/1.73    Globulin 3.4 gm/dL    A/G Ratio 1.3 g/dL    BUN/Creatinine Ratio 16.2 7.0 - 25.0    Anion Gap 12.1 5.0 - 15.0 mmol/L   Hemoglobin A1c   Result Value Ref Range    Hemoglobin A1C 5.60 4.80 - 5.60 %   Lipid Panel   Result Value Ref Range    Total Cholesterol 202 (H) 0 - 200 mg/dL    Triglycerides 341 (H) 0 - 150 mg/dL    HDL Cholesterol 41 40 - 60 mg/dL    LDL Cholesterol  93 0 - 100 mg/dL    VLDL Cholesterol 68.2 (H) 5 - 40 mg/dL    LDL/HDL Ratio 2.26    TSH Rfx On Abnormal To Free T4   Result Value Ref Range    TSH 2.210 0.270 - 4.200 uIU/mL        Assessment and Plan  Arminda was seen today for annual exam.    Diagnoses and all orders for this visit:    Annual physical exam  - Counseling was given to patient for the following topics:  appropriate exercise, healthy eating habits, disease prevention, risk factors for cancer, importance of self breast exam and breast health, importance of immunizations, including risks and benefits, sun safety and seatbelt use. Also discussed the importance of regular dental and vision care, as well recommendation for a yearly screening skin exam after age 40.  Written information provided to patient on these topics and other health maintenance issues.  - Labs next visit    Well woman exam with routine gynecological exam  - Pap smear and breast exam today. Noted thin white discharge. Patient denies symptoms or concern, typical for this time of cycle. Declined testing.    Birth control counseling  - Interested in nexplanon, referred to  GYN.    Moderate episode of recurrent major depressive disorder (CMS/HCC) and Anxiety  - On cymbalta 90mg daily with improvement in anxiety, irritability, depression. No SI. Refilled today.    Class 2 obesity due to excess calories without serious comorbidity with body mass index (BMI) of 35.0 to 35.9 in adult  - Has made significant dietary changes, exercising daily and has consistently lost weight during her last several visits. Weight today 222lbs which is down from 253lbs in December and 235lbs at last visit in July.  - Unfortunately saxenda was not covered by insurance even with PA.   - She will continue with lifestyle changes and we will follow up in January. Goal is to be at 200lbs.    Health Maintenance   Topic Date Due   • ANNUAL PHYSICAL  05/05/1993   • HEPATITIS C SCREENING  02/13/2020   • PAP SMEAR  02/13/2020   • INFLUENZA VACCINE  08/01/2020   • TDAP/TD VACCINES (2 - Td) 02/13/2030     Health Maintenance  - Pap smear: Last at [facility] in 2017, normal. Repeated today.  - Mammogram: Start screening at age 40.  - Colonoscopy: Start screening at age 45-50.  - Immunizations: Tdap 2/2020. Flu discussed, unavailable today.  - Depression screening: PHQ9 = 11 2/2020 addressed above.    Return in about 4 months (around 1/2/2021) for Follow up weight loss.    Answers for HPI/ROS submitted by the patient on 8/31/2020   What is the primary reason for your visit?: Other  Please describe your symptoms.: I do not have any symptoms - I was just wanting to look into some permanent birth control options for myself.  Have you had these symptoms before?: Yes  How long have you been having these symptoms?: Greater than 2 weeks

## 2020-09-18 ENCOUNTER — TELEPHONE (OUTPATIENT)
Dept: OBSTETRICS AND GYNECOLOGY | Facility: CLINIC | Age: 30
End: 2020-09-18

## 2020-09-23 ENCOUNTER — OFFICE VISIT (OUTPATIENT)
Dept: OBSTETRICS AND GYNECOLOGY | Facility: CLINIC | Age: 30
End: 2020-09-23

## 2020-09-23 VITALS
SYSTOLIC BLOOD PRESSURE: 116 MMHG | HEIGHT: 66 IN | DIASTOLIC BLOOD PRESSURE: 70 MMHG | BODY MASS INDEX: 35.36 KG/M2 | WEIGHT: 220 LBS

## 2020-09-23 DIAGNOSIS — Z30.014 ENCOUNTER FOR INITIAL PRESCRIPTION OF INTRAUTERINE CONTRACEPTIVE DEVICE (IUD): ICD-10-CM

## 2020-09-23 DIAGNOSIS — Z30.430 ENCOUNTER FOR INSERTION OF PARAGARD IUD: Primary | ICD-10-CM

## 2020-09-23 PROBLEM — Z97.5 IUD (INTRAUTERINE DEVICE) IN PLACE: Status: ACTIVE | Noted: 2020-09-23

## 2020-09-23 PROCEDURE — 99202 OFFICE O/P NEW SF 15 MIN: CPT | Performed by: OBSTETRICS & GYNECOLOGY

## 2020-09-23 PROCEDURE — 58300 INSERT INTRAUTERINE DEVICE: CPT | Performed by: OBSTETRICS & GYNECOLOGY

## 2020-09-23 RX ORDER — COPPER 313.4 MG/1
INTRAUTERINE DEVICE INTRAUTERINE ONCE
Status: COMPLETED | OUTPATIENT
Start: 2020-09-23 | End: 2020-09-23

## 2020-09-23 RX ADMIN — COPPER: 313.4 INTRAUTERINE DEVICE INTRAUTERINE at 16:28

## 2020-09-23 NOTE — PROGRESS NOTES
Subjective   Chief Complaint   Patient presents with   • Contraception     paraguard iud insertion     Arminda Alexis is a 30 y.o. year old .  Patient's last menstrual period was 2020.  She presents to be seen because of her desire to have a ParaGard IUD placed.  She was referred elsewhere apparently gynecologist did not accept her insurance.  She has tried birth control pills in the past and has issues with moodiness and maybe depression irritability.  Also forgot to take birth control pills.  She would like to avoid hormonal methods.  Her cycles are regular in the 5 days of flow or not typically very heavy.  I discussed that the ParaGard IUD will not lighten the flow but should not make it any worse either.  She voices understanding.  She was given Motrin here in the office as she was worked in today by our staff since she was on her cycle    Patient's last menstrual period was 2020.  Cycle Frequency: regular, predictable and consistent every 28 - 32 days   Menstrual cycle character: flow is typically normal   Cycle Duration: 4 - 5                   The following portions of the patient's history were reviewed and updated as appropriate:She  has a past medical history of Decreased body weight (2020), Depression, and Headache.  She does not have any pertinent problems on file.  She  has a past surgical history that includes Tonsillectomy and [place] tooth extraction (Bilateral).  Her family history includes Anxiety disorder in her mother and sister; Brain cancer in her maternal grandfather; Breast cancer (age of onset: 50) in her mother; Depression in her mother and sister; Diabetes in her father, maternal grandfather, maternal grandmother, paternal grandfather, and paternal grandmother; Liver disease in her half-brother.  She  reports that she quit smoking about 4 years ago. Her smoking use included cigarettes. She has a 3.50 pack-year smoking history. She has never used smokeless tobacco. She  "reports previous alcohol use of about 2.0 standard drinks of alcohol per week. She reports that she does not use drugs.  She is allergic to wellbutrin [bupropion].      Current Outpatient Medications:   •  DULoxetine (CYMBALTA) 30 MG capsule, Take 1 capsule by mouth Daily. Take with 60mg to make for total of 90mg daily., Disp: 30 capsule, Rfl: 11  •  DULoxetine (CYMBALTA) 60 MG capsule, Take 1 capsule by mouth Daily. Take with 30mg to make total of 90mg daily., Disp: 30 capsule, Rfl: 11  •  hydrOXYzine pamoate (Vistaril) 25 MG capsule, Take 1 capsule by mouth 3 (Three) Times a Day As Needed for Anxiety., Disp: 30 capsule, Rfl: 1    Current Facility-Administered Medications:   •  Paragard Intrauterine Copper IUD, , Intrauterine, Once, Jhon Elizalde MD    no or minimal alcohol, nonsmoker    Review of Systems she complains of fatigue prolonged sadness no cough constipation chest pain rashes headaches urinary leakage.  Medical history: Positive gestational diabetes positive depression anxiety no seizures asthma hypertension thyroid arthritis high cholesterol.  GYN questionnaire no STD endometriosis cyst fibroids abuse or abnormal Pap smears.  2 vaginal deliveries.  Social history she is  currently  does not smoke does drink maybe 1 alcoholic beverage per week.  Currently taking Cymbalta.           Objective   /70   Ht 167 cm (65.75\")   Wt 99.8 kg (220 lb)   LMP 09/20/2020   Breastfeeding No   BMI 35.78 kg/m²     General:  well developed; well nourished  no acute distress  appears stated age   Skin:  No suspicious lesions seen   Thyroid: not examined   Lungs:  breathing is unlabored   Heart:  Not performed.   Abdomen: soft, non-tender; no masses  no umbilical or inguinal hernias are present  no hepato-splenomegaly   Pelvis: Clinical staff was present for exam  External genitalia:  normal appearance of the external genitalia including Bartholin's and [place]'s glands.  :  urethral meatus " normal;  Vaginal:  normal pink mucosa without prolapse or lesions. blood present -  small amount;  Uterus:  normal size, shape and consistency. anteverted;  Adnexa:  normal bimanual exam of the adnexa.  Rectal:  digital rectal exam not performed; anus visually normal appearing.       IUD Insertion    Patient's last menstrual period was 09/20/2020.    Date of procedure:  9/23/2020    Risks and benefits discussed? yes  All questions answered? yes  Consents given by The patient  Written consent obtained? yes      Procedure documentation:    After verifying the patient had a low probability of being pregnant and met the criteria for insertion, a sterile speculum has placed.  Vaginal discharge was scant.  The anterior lip of the cervix was grasped with a tenaculum and the uterine cavity was gently sounded. There was mild difficulty passing the sound through the cervix.  Cervical dilation did not need to be performed prior to placing the IUD.  The uterus was anteverted and sounded to 8 cms.  The ParaGard was  prepared per the manufacturers instructions.    The Paraguard was advanced through the cervical canal until the upper edge of the flange was flush with the external cervix.  The insertion kianna was held in place while the insertion tube was withdrawn.  I waited 10-15 seconds for the arms of the IUS to fully open and the devise to seat in the cavity.  The kianna was removed first followed by the insertion tube.The string was cut 3 cms in length.  Bleeding from the cervix was scant.    Arminda Alexis tolerated the procedure with mild vagal reaction with some nausea.  This was resolved with time.    Post procedure instructions: Call if any fever, heavy bleeding , malodorous discharge or pain.                                                                                                                        It will be effective after 72 hours.                                                        She was given patient  information booklet                                                            Use over-the-counter Advil / Aleve prn.                                                                                        Lab Review   Pap test    Imaging   No data reviewed       Assessment     1. Desires ParaGard IUD placement.  Tolerated fairly well with mild vagal reaction with some diaphoresis and nausea no vomiting or dizziness.  She was observed until she was ready to leave feeling normal.  2. Status post 2 vaginal deliveries  3. Relatively normal flow with periods         Plan     1. Advil Aleve heating pad as needed  2. Follow-up in 6 weeks call if any problems        No orders of the defined types were placed in this encounter.    New Medications Ordered This Visit   Medications   • Paragard Intrauterine Copper IUD              This note was electronically signed.    Jhon Elizalde MD  September 23, 2020

## 2020-09-24 DIAGNOSIS — L70.0 ACNE VULGARIS: Primary | ICD-10-CM

## 2020-09-24 DIAGNOSIS — Z00.00 HEALTHCARE MAINTENANCE: ICD-10-CM

## 2020-09-24 RX ORDER — SPIRONOLACTONE 25 MG/1
25 TABLET ORAL DAILY
Qty: 30 TABLET | Refills: 11 | Status: SHIPPED | OUTPATIENT
Start: 2020-09-24 | End: 2020-09-29

## 2020-09-29 DIAGNOSIS — L70.0 ACNE VULGARIS: Primary | ICD-10-CM

## 2020-09-29 RX ORDER — CLINDAMYCIN AND BENZOYL PEROXIDE 10; 50 MG/G; MG/G
GEL TOPICAL EVERY 12 HOURS SCHEDULED
Qty: 50 G | Refills: 11 | Status: SHIPPED | OUTPATIENT
Start: 2020-09-29 | End: 2020-09-30

## 2020-09-30 DIAGNOSIS — L70.0 ACNE VULGARIS: Primary | ICD-10-CM

## 2020-09-30 RX ORDER — ERYTHROMYCIN AND BENZOYL PEROXIDE 30; 50 MG/G; MG/G
GEL TOPICAL 2 TIMES DAILY
Qty: 46.6 G | Refills: 11 | Status: SHIPPED | OUTPATIENT
Start: 2020-09-30 | End: 2020-11-04

## 2020-11-04 ENCOUNTER — OFFICE VISIT (OUTPATIENT)
Dept: OBSTETRICS AND GYNECOLOGY | Facility: CLINIC | Age: 30
End: 2020-11-04

## 2020-11-04 VITALS
DIASTOLIC BLOOD PRESSURE: 70 MMHG | RESPIRATION RATE: 16 BRPM | SYSTOLIC BLOOD PRESSURE: 118 MMHG | WEIGHT: 215 LBS | BODY MASS INDEX: 34.97 KG/M2

## 2020-11-04 DIAGNOSIS — Z30.431 IUD CHECK UP: Primary | ICD-10-CM

## 2020-11-04 PROCEDURE — 99213 OFFICE O/P EST LOW 20 MIN: CPT | Performed by: OBSTETRICS & GYNECOLOGY

## 2020-11-04 NOTE — PROGRESS NOTES
uterusSubjective   Chief Complaint   Patient presents with   • Follow-up     paragard     Arminda Alexis is a 30 y.o. year old  presenting to be seen for follow-up of her recently placed ParaGard Since the time of insertion she has been sexually active.  [place] has not been painful for her.   [place] has not  been painful for her partner.  She has checked for the IUD string. She wasable to feel the IUD strings.  She has had a menstrual period since insertion.   The flow was increased.  It lasted about 8 days with a couple of heavy days.  This may be about 2 days longer than usual  Her cramps were within normal limits.  She did cramp about 2 weeks after was placed but no real changed during her menstrual cycle.  Does not take over-the-counter medications.         Objective   /70   Resp 16   Wt 97.5 kg (215 lb)   LMP 10/12/2020   Breastfeeding No   BMI 34.97 kg/m²     GENERAL: Well nourished in no acute distress  ABDOMEN: soft non-tender no CVAT  PELVIC: external normal    speculum - normal cervix ; The IUD string was easily seen.   BIMANUAL EXAM :anteverted  no tenderness    Imaging   No data reviewed      Current Outpatient Medications:   •  DULoxetine (CYMBALTA) 30 MG capsule, Take 1 capsule by mouth Daily. Take with 60mg to make for total of 90mg daily., Disp: 30 capsule, Rfl: 11  •  DULoxetine (CYMBALTA) 60 MG capsule, Take 1 capsule by mouth Daily. Take with 30mg to make total of 90mg daily., Disp: 30 capsule, Rfl: 11  •  hydrOXYzine pamoate (Vistaril) 25 MG capsule, Take 1 capsule by mouth 3 (Three) Times a Day As Needed for Anxiety., Disp: 30 capsule, Rfl: 1  •  PARAGARD INTRAUTERINE COPPER IU, by Intrauterine route., Disp: , Rfl:                 Assessment   1. Normal ParaGard insertion.  Reassured that it may take up to 6 months for cycles to normalize  2. Advil or Aleve for cramping     Plan   1. Follow up as needed or for annual exam    No orders of the defined types were placed in  this encounter.    No orders of the defined types were placed in this encounter.         This note was electronically signed.    Jhon Elizalde M.D.  November 4, 2020

## 2021-01-05 DIAGNOSIS — F41.9 ANXIETY: ICD-10-CM

## 2021-01-05 RX ORDER — HYDROXYZINE PAMOATE 25 MG/1
CAPSULE ORAL
Qty: 30 CAPSULE | Refills: 2 | Status: SHIPPED | OUTPATIENT
Start: 2021-01-05

## 2021-01-05 NOTE — TELEPHONE ENCOUNTER
Last Office Visit:  9/2/20  Next Office Visit: 9/8/21    Labs completed in past 6 months? yes  Labs completed in past year? no    Last Refill Date: 12/1/2020  Quantity: #30  Refills: 0    Pharmacy: [facility] [address] [place] [address] [address] [facility] [phone] [facility] [phone] FX

## 2021-09-08 ENCOUNTER — OFFICE VISIT (OUTPATIENT)
Dept: INTERNAL MEDICINE | Facility: CLINIC | Age: 31
End: 2021-09-08

## 2021-09-08 VITALS
HEART RATE: 79 BPM | TEMPERATURE: 97.5 F | DIASTOLIC BLOOD PRESSURE: 74 MMHG | SYSTOLIC BLOOD PRESSURE: 130 MMHG | OXYGEN SATURATION: 99 % | RESPIRATION RATE: 16 BRPM | BODY MASS INDEX: 39.6 KG/M2 | HEIGHT: 66 IN | WEIGHT: 246.4 LBS

## 2021-09-08 DIAGNOSIS — Z13.220 SCREENING, LIPID: ICD-10-CM

## 2021-09-08 DIAGNOSIS — Z00.00 ANNUAL PHYSICAL EXAM: Primary | ICD-10-CM

## 2021-09-08 DIAGNOSIS — F41.9 ANXIETY: ICD-10-CM

## 2021-09-08 DIAGNOSIS — Z11.59 ENCOUNTER FOR HEPATITIS C SCREENING TEST FOR LOW RISK PATIENT: ICD-10-CM

## 2021-09-08 DIAGNOSIS — E66.01 CLASS 3 SEVERE OBESITY DUE TO EXCESS CALORIES WITHOUT SERIOUS COMORBIDITY WITH BODY MASS INDEX (BMI) OF 40.0 TO 44.9 IN ADULT (HCC): ICD-10-CM

## 2021-09-08 DIAGNOSIS — F33.1 MODERATE EPISODE OF RECURRENT MAJOR DEPRESSIVE DISORDER (HCC): ICD-10-CM

## 2021-09-08 DIAGNOSIS — Z23 NEED FOR INFLUENZA VACCINATION: ICD-10-CM

## 2021-09-08 PROCEDURE — 90471 IMMUNIZATION ADMIN: CPT | Performed by: INTERNAL MEDICINE

## 2021-09-08 PROCEDURE — 99395 PREV VISIT EST AGE 18-39: CPT | Performed by: INTERNAL MEDICINE

## 2021-09-08 PROCEDURE — 90686 IIV4 VACC NO PRSV 0.5 ML IM: CPT | Performed by: INTERNAL MEDICINE

## 2021-09-08 RX ORDER — DULOXETIN HYDROCHLORIDE 30 MG/1
30 CAPSULE, DELAYED RELEASE ORAL DAILY
Qty: 30 CAPSULE | Refills: 11 | Status: SHIPPED | OUTPATIENT
Start: 2021-09-08 | End: 2022-03-09

## 2021-09-08 RX ORDER — DULOXETIN HYDROCHLORIDE 60 MG/1
60 CAPSULE, DELAYED RELEASE ORAL DAILY
Qty: 30 CAPSULE | Refills: 11 | Status: SHIPPED | OUTPATIENT
Start: 2021-09-08 | End: 2022-05-02

## 2021-09-08 NOTE — PROGRESS NOTES
Internal Medicine Annual Exam  Arminda Alexis is a 31 y.o. female who presents today for an annual exam and with concerns as outlined below.    Chief Complaint  Chief Complaint   Patient presents with   • Annual Exam        HPI  Ms. Alexis comes in today for physical. She is UTD with dental and vision exams. She is UTD with Tdap and COVID vaccinations. She has upcoming GYN appointment, pap is UTD. She now has paragard IUD. Notes heavy menstrual bleeding, pelvic pain, spotting. She has gone through a divorce and during that time was not meal prepping and has regained much of the weight she lost previously. She is starting to meal prep again. She notes that mood has been stable on cymbalta 90mg daily and vistaril PRN about 1-2 times weekly. She is not smoking, drinking alcohol, or using illicit substances.       Review of Systems  Review of Systems   Constitutional: Negative.    HENT: Negative for dental problem and hearing loss.    Eyes: Negative.    Respiratory: Negative.    Cardiovascular: Negative.    Gastrointestinal: Negative.    Genitourinary: Positive for menstrual problem, pelvic pain and vaginal bleeding.   Musculoskeletal: Negative.    Skin: Negative.    Neurological: Negative.    Psychiatric/Behavioral: Positive for decreased concentration, depressed mood and stress. Negative for self-injury and suicidal ideas. The patient is nervous/anxious.         Past Medical History  Past Medical History:   Diagnosis Date   • Anxiety 2011   • Decreased body weight 2/13/2020   • Depression    • Headache    • Obesity 2013        Surgical History  Past Surgical History:   Procedure Laterality Date   • TONSILLECTOMY Bilateral 07/2011   • WISDOM TOOTH EXTRACTION Bilateral 2010        Family History  Family History   Problem Relation Age of Onset   • Breast cancer Mother 50        s/p double mastectomy   • Depression Mother    • Anxiety disorder Mother    • Cancer Mother         breast cancer   • Diabetes Father    • Diabetes  Maternal Grandmother    • Diabetes Maternal Grandfather    • Brain cancer Maternal Grandfather    • Cancer Maternal Grandfather         brain cancer   • Diabetes Paternal Grandmother    • Diabetes Paternal Grandfather    • Depression Sister    • Anxiety disorder Sister    • Liver disease Half-Brother         fatty liver   • Colon cancer Neg Hx    • Osteoporosis Neg Hx         Social History  Social History     Socioeconomic History   • Marital status:      Spouse name: Not on file   • Number of children: Not on file   • Years of education: Not on file   • Highest education level: Not on file   Tobacco Use   • Smoking status: Former Smoker     Packs/day: 0.50     Years: 7.00     Pack years: 3.50     Types: Cigarettes     Quit date: 2016     Years since quittin.1   • Smokeless tobacco: Never Used   • Tobacco comment:  quit   Vaping Use   • Vaping Use: Never used   Substance and Sexual Activity   • Alcohol use: Not Currently     Alcohol/week: 2.0 standard drinks     Types: 1 Shots of liquor, 1 Standard drinks or equivalent per week   • Drug use: Never   • Sexual activity: Yes     Partners: Male     Birth control/protection: I.U.D.        Current Medications  Current Outpatient Medications on File Prior to Visit   Medication Sig Dispense Refill   • DULoxetine (CYMBALTA) 30 MG capsule Take 1 capsule by mouth Daily. Take with 60mg to make for total of 90mg daily. 30 capsule 11   • DULoxetine (CYMBALTA) 60 MG capsule Take 1 capsule by mouth Daily. Take with 30mg to make total of 90mg daily. 30 capsule 11   • hydrOXYzine pamoate (VISTARIL) 25 MG capsule TAKE ONE CAPSULE BY MOUTH THREE TIMES A DAY AS NEEDED FOR ANXIETY 30 capsule 2   • PARAGARD INTRAUTERINE COPPER IU by Intrauterine route.       No current facility-administered medications on file prior to visit.       Allergies  Allergies   Allergen Reactions   • Wellbutrin [Bupropion] Irritability        Objective  Visit Vitals  /74   Pulse 79  "  Temp 97.5 °F (36.4 °C)   Resp 16   Ht 167 cm (65.75\")   Wt 112 kg (246 lb 6.4 oz)   SpO2 99%   BMI 40.08 kg/m²        Physical Exam  Physical Exam  Vitals and nursing note reviewed.   Constitutional:       General: She is not in acute distress.     Appearance: She is well-developed. She is obese. She is not ill-appearing, toxic-appearing or diaphoretic.   HENT:      Head: Normocephalic and atraumatic.      Right Ear: Tympanic membrane, ear canal and external ear normal.      Left Ear: Tympanic membrane, ear canal and external ear normal.      Nose: Nose normal.   Eyes:      General: No scleral icterus.     Conjunctiva/sclera: Conjunctivae normal.   Cardiovascular:      Rate and Rhythm: Normal rate and regular rhythm.      Heart sounds: Normal heart sounds. No murmur heard.     Pulmonary:      Effort: Pulmonary effort is normal. No respiratory distress.      Breath sounds: Normal breath sounds.   Abdominal:      General: Bowel sounds are normal. There is no distension.      Palpations: Abdomen is soft. There is no mass.      Tenderness: There is no abdominal tenderness.   Musculoskeletal:         General: No deformity.      Cervical back: Neck supple.      Right lower leg: No edema.      Left lower leg: No edema.   Lymphadenopathy:      Cervical: No cervical adenopathy.   Skin:     General: Skin is warm and dry.      Findings: No rash.   Neurological:      Mental Status: She is alert and oriented to person, place, and time. Mental status is at baseline.      Gait: Gait normal.   Psychiatric:         Mood and Affect: Mood normal.         Behavior: Behavior normal.         Thought Content: Thought content normal.         Judgment: Judgment normal.          Results  Results for orders placed or performed in visit on 02/13/20   Prolactin    Specimen: Blood   Result Value Ref Range    Prolactin 8.80 4.79 - 23.30 ng/mL   CBC (No Diff)    Specimen: Blood   Result Value Ref Range    WBC 10.97 (H) 3.40 - 10.80 10*3/mm3    " RBC 5.28 3.77 - 5.28 10*6/mm3    Hemoglobin 14.7 12.0 - 15.9 g/dL    Hematocrit 43.6 34.0 - 46.6 %    MCV 82.6 79.0 - 97.0 fL    MCH 27.8 26.6 - 33.0 pg    MCHC 33.7 31.5 - 35.7 g/dL    RDW 14.6 12.3 - 15.4 %    RDW-SD 43.8 37.0 - 54.0 fl    MPV 10.3 6.0 - 12.0 fL    Platelets 303 140 - 450 10*3/mm3   Comprehensive Metabolic Panel    Specimen: Blood   Result Value Ref Range    Glucose 81 65 - 99 mg/dL    BUN 11 6 - 20 mg/dL    Creatinine 0.68 0.57 - 1.00 mg/dL    Sodium 137 136 - 145 mmol/L    Potassium 4.3 3.5 - 5.2 mmol/L    Chloride 100 98 - 107 mmol/L    CO2 24.9 22.0 - 29.0 mmol/L    Calcium 9.8 8.6 - 10.5 mg/dL    Total Protein 7.9 6.0 - 8.5 g/dL    Albumin 4.50 3.50 - 5.20 g/dL    ALT (SGPT) 30 1 - 33 U/L    AST (SGOT) 24 1 - 32 U/L    Alkaline Phosphatase 58 39 - 117 U/L    Total Bilirubin 0.3 0.2 - 1.2 mg/dL    eGFR Non African Amer 102 >60 mL/min/1.73    Globulin 3.4 gm/dL    A/G Ratio 1.3 g/dL    BUN/Creatinine Ratio 16.2 7.0 - 25.0    Anion Gap 12.1 5.0 - 15.0 mmol/L   Hemoglobin A1c    Specimen: Blood   Result Value Ref Range    Hemoglobin A1C 5.60 4.80 - 5.60 %   Lipid Panel    Specimen: Blood   Result Value Ref Range    Total Cholesterol 202 (H) 0 - 200 mg/dL    Triglycerides 341 (H) 0 - 150 mg/dL    HDL Cholesterol 41 40 - 60 mg/dL    LDL Cholesterol  93 0 - 100 mg/dL    VLDL Cholesterol 68.2 (H) 5 - 40 mg/dL    LDL/HDL Ratio 2.26    TSH Rfx On Abnormal To Free T4    Specimen: Blood   Result Value Ref Range    TSH 2.210 0.270 - 4.200 uIU/mL        Assessment and Plan  Diagnoses and all orders for this visit:    Annual physical exam  - Counseling was given to patient for the following topics:  appropriate exercise, healthy eating habits, disease prevention and importance of immunizations, including risks and benefits. Also discussed the importance of regular dental and vision care, as well recommendation for a yearly screening skin exam after age 40.      Anxiety and Moderate episode of recurrent  major depressive disorder (CMS/HCC)  - Mood improved, PHQ9 down to 7 today. Rare anxiety.  - Continue cymbalta 90mg daily and vistaril 25mg TID PRN    Class 3 obesity due to excess calories without serious comorbidity with body mass index (BMI) of 40.0 to 44.9 in adult  - After recent divorce she was unable to maintain healthy eating habits and regained much of her previously lost weight.  - She is planning to resume meal prep    Screening, lipid  - Lipid panel ordered    Encounter for hepatitis C screening test for low risk patient  - HCV ab ordered    Health Maintenance   Topic Date Due   • HEPATITIS C SCREENING  Never done   • ANNUAL PHYSICAL  09/03/2021   • INFLUENZA VACCINE  10/01/2021   • PAP SMEAR  09/02/2023   • TDAP/TD VACCINES (2 - Td or Tdap) 02/13/2030   • COVID-19 Vaccine  Completed   • Pneumococcal Vaccine 0-64  Aged Out       Health Maintenance  - Pap smear: 9/2020 negative  - Mammogram: Start screening at age 40.  - Colonoscopy: Start screening at age 45.  - HCV: ordered  - Immunizations: COVID complete. Tdap 2/2020. Flu today.   - Depression screening: PHQ9 = 7 9/2021    Return in about 6 months (around 3/8/2022) for Follow up depression/anxiety, 1 year for annual.

## 2021-09-08 NOTE — PROGRESS NOTES
Immunization  Immunization performed in Left Deltoid by Perlita Lyons MA.Pt tolerated Flu vaccine no adverse effects. 09/08/21   Perlita Lyons MA

## 2021-09-27 ENCOUNTER — OFFICE VISIT (OUTPATIENT)
Dept: OBSTETRICS AND GYNECOLOGY | Facility: CLINIC | Age: 31
End: 2021-09-27

## 2021-09-27 VITALS
SYSTOLIC BLOOD PRESSURE: 118 MMHG | WEIGHT: 250 LBS | BODY MASS INDEX: 40.66 KG/M2 | DIASTOLIC BLOOD PRESSURE: 70 MMHG | RESPIRATION RATE: 16 BRPM

## 2021-09-27 DIAGNOSIS — Z01.411 ENCOUNTER FOR GYNECOLOGICAL EXAMINATION WITH ABNORMAL FINDING: Primary | ICD-10-CM

## 2021-09-27 DIAGNOSIS — Z30.011 ENCOUNTER FOR INITIAL PRESCRIPTION OF CONTRACEPTIVE PILLS: ICD-10-CM

## 2021-09-27 DIAGNOSIS — Z80.3 FAMILY HISTORY OF BREAST CANCER: ICD-10-CM

## 2021-09-27 DIAGNOSIS — Z30.432 ENCOUNTER FOR IUD REMOVAL: ICD-10-CM

## 2021-09-27 DIAGNOSIS — N63.20 LEFT BREAST LUMP: ICD-10-CM

## 2021-09-27 PROCEDURE — 58301 REMOVE INTRAUTERINE DEVICE: CPT | Performed by: NURSE PRACTITIONER

## 2021-09-27 PROCEDURE — 99395 PREV VISIT EST AGE 18-39: CPT | Performed by: NURSE PRACTITIONER

## 2021-09-27 RX ORDER — NORETHINDRONE ACETATE AND ETHINYL ESTRADIOL AND FERROUS FUMARATE 1MG-20(24)
1 KIT ORAL DAILY
Qty: 28 TABLET | Refills: 12 | Status: SHIPPED | OUTPATIENT
Start: 2021-09-27 | End: 2022-03-09

## 2021-09-27 NOTE — PROGRESS NOTES
Annual Visit     Patient Name: Arminda Alexis  : 1990   MRN: [id number]   Care Team: Patient Care Team:  Radha Rosales MD as PCP - General (Internal Medicine)    Chief Complaint:    Chief Complaint   Patient presents with   • Annual Exam     wants paraguard iud removed       HPI: Arminda Alexis is a 31 y.o. year old  presenting to be seen for her gynecologic exam.   Paraguard inserted 2020   Periods are monthly with heavier flow   She would like to discuss other options today   She has used Nuva ring in the past with severe mood changes   Has done well with COCs     Pap smear  WNL     Left breast u/s done in    Abn CBE with PCP   U/s revealed ecstatic ducts and she was referred to breast surgeon for further eval   States she did not keep that appt bc her insurance would not cover it   The nipple d/c she was having has since resolved   No change on monthly SBEs   Mother with hx of breast cancer at age 50       Subjective      /70   Resp 16   Wt 113 kg (250 lb)   LMP 2021   Breastfeeding No   BMI 40.66 kg/m²     BMI reviewed: Body mass index is 40.66 kg/m².      Objective     Physical Exam    Neuro: alert and oriented to person, place and time   General:  alert; cooperative; well developed; well nourished   Skin:  No suspicious lesions seen   Thyroid: normal to inspection and palpation   Lungs:  breathing is unlabored  clear to auscultation bilaterally   Heart:  regular rate and rhythm, S1, S2 normal, no murmur, click, rub or gallop  normal apical impulse   Breasts:  Examined in supine position  Nipples normal without inversion, lesions or discharge  There are no palpable axillary nodes  There is an ~  2 cm mass is present in the left breast at 12 o'clock.  It is less than 1 cm from the outer edge of the areola.  It is not freely mobile.  Right breast without masses   Abdomen: soft, non-tender; no masses  no umbilical or inguinal hernias are present  no hepato-splenomegaly    Pelvis: Clinical staff was present for exam  External genitalia:  normal appearance of the external genitalia including Bartholin's and [place]'s glands.  :  urethral meatus normal;  Vaginal:  normal pink mucosa without prolapse or lesions.  Cervix:  normal appearance. IUD string present - 3 cms in length;  Uterus:  normal size, shape and consistency.  Adnexa:  non palpable bilaterally.  Rectal:  digital rectal exam not performed; anus visually normal appearing.     IUD Removal     With the patient in the dorsal lithotomy position, a speculum was placed into vagina.   The ParaGard IUD was removed and shown to the patient.   The patient tolerated procedure well.     Assessment / Plan      Assessment  Problems Addressed This Visit    ICD-10-CM ICD-9-CM   1. Encounter for gynecological examination with abnormal finding  Z01.411 V72.31   2. Encounter for initial prescription of contraceptive pills  Z30.011 V25.01   3. Encounter for IUD removal  Z30.432 V25.12   4. Left breast lump  N63.20 611.72   5. Family history of breast cancer  Z80.3 V16.3       Plan    Pap smear not indicated   Discussed monthly SBEs   Discussed CBE findings today - order mammogram for further eval since only u/s done last yr   Will call to discuss results     Paraguard removed without difficulty   Discussed no delay in return to fertility   Faithful condom use until next period - will start COCs within 1st 5 days of period   No C/is to COCs   Considering she has used in the past and done well, will f/u in 1 yr and she will call with any problems     AV 1 yr           Follow Up  Return in about 1 year (around 9/27/2022) for Annual physical.  Patient was given instructions and counseling regarding her condition or for health maintenance advice. Please see specific information pulled into the AVS if appropriate.     Fabiola Lozano, TIM  September 27, 2021  16:02 EDT

## 2021-11-04 ENCOUNTER — HOSPITAL ENCOUNTER (OUTPATIENT)
Dept: MAMMOGRAPHY | Facility: HOSPITAL | Age: 31
Discharge: HOME | End: 2021-11-04

## 2021-11-04 ENCOUNTER — HOSPITAL ENCOUNTER (OUTPATIENT)
Dept: ULTRASOUND IMAGING | Facility: HOSPITAL | Age: 31
Discharge: HOME | End: 2021-11-04

## 2021-11-04 ENCOUNTER — TRANSCRIBE ORDERS (OUTPATIENT)
Dept: MAMMOGRAPHY | Facility: HOSPITAL | Age: 31
End: 2021-11-04

## 2021-11-04 DIAGNOSIS — N63.20 LEFT BREAST LUMP: ICD-10-CM

## 2021-11-04 DIAGNOSIS — R92.8 ABNORMAL MAMMOGRAM: Primary | ICD-10-CM

## 2021-11-04 PROCEDURE — 77066 DX MAMMO INCL CAD BI: CPT

## 2021-11-04 PROCEDURE — 76642 ULTRASOUND BREAST LIMITED: CPT

## 2021-11-04 PROCEDURE — 77062 BREAST TOMOSYNTHESIS BI: CPT | Performed by: RADIOLOGY

## 2021-11-04 PROCEDURE — 76642 ULTRASOUND BREAST LIMITED: CPT | Performed by: RADIOLOGY

## 2021-11-04 PROCEDURE — 77066 DX MAMMO INCL CAD BI: CPT | Performed by: RADIOLOGY

## 2021-11-04 PROCEDURE — G0279 TOMOSYNTHESIS, MAMMO: HCPCS

## 2021-11-19 ENCOUNTER — OFFICE VISIT (OUTPATIENT)
Dept: INTERNAL MEDICINE | Facility: CLINIC | Age: 31
End: 2021-11-19

## 2021-11-19 VITALS
OXYGEN SATURATION: 98 % | WEIGHT: 252.2 LBS | SYSTOLIC BLOOD PRESSURE: 132 MMHG | RESPIRATION RATE: 16 BRPM | DIASTOLIC BLOOD PRESSURE: 74 MMHG | HEART RATE: 75 BPM | TEMPERATURE: 96.8 F | HEIGHT: 66 IN | BODY MASS INDEX: 40.53 KG/M2

## 2021-11-19 DIAGNOSIS — L30.9 DERMATITIS: Primary | ICD-10-CM

## 2021-11-19 PROCEDURE — 99213 OFFICE O/P EST LOW 20 MIN: CPT | Performed by: INTERNAL MEDICINE

## 2021-11-19 PROCEDURE — 96372 THER/PROPH/DIAG INJ SC/IM: CPT | Performed by: INTERNAL MEDICINE

## 2021-11-19 RX ORDER — PREDNISONE 10 MG/1
TABLET ORAL
Qty: 35 TABLET | Refills: 0 | Status: SHIPPED | OUTPATIENT
Start: 2021-11-19 | End: 2021-12-04

## 2021-11-19 RX ORDER — METHYLPREDNISOLONE ACETATE 40 MG/ML
40 INJECTION, SUSPENSION INTRA-ARTICULAR; INTRALESIONAL; INTRAMUSCULAR; SOFT TISSUE ONCE
Status: COMPLETED | OUTPATIENT
Start: 2021-11-19 | End: 2021-11-19

## 2021-11-19 RX ADMIN — METHYLPREDNISOLONE ACETATE 40 MG: 40 INJECTION, SUSPENSION INTRA-ARTICULAR; INTRALESIONAL; INTRAMUSCULAR; SOFT TISSUE at 09:19

## 2021-11-19 NOTE — PROGRESS NOTES
Internal Medicine Acute Visit    Chief Complaint   Patient presents with   • Rash     6x 6 weeks, nothing changed,itchy with spots        HPI  Ms. Alexis comes in today with an intensely pruritic rash. She notes this has been present for 6 weeks. It is worst on hands and wrists but also involves lower legs, back. No facial rash. She notes that she has not used any new products and has quit using makeup. She looked for bed bugs but found none. None of her family members have a rash. She has continued to notice new lesions daily even when not at home. She has tried nothing to alleviate her symptoms.    Rash  This is a new problem. The current episode started more than 1 month ago. The problem has been waxing and waning since onset. The affected locations include the torso, back, left arm, left hand, left wrist, left fingers, right arm, right hand, right wrist, right fingers and right buttock. The rash is characterized by redness, itchiness and peeling. She was exposed to nothing. Associated symptoms include fatigue and nail changes. Pertinent negatives include no anorexia, congestion, cough, diarrhea, eye pain, facial edema, fever, joint pain, rhinorrhea, sore throat or vomiting.        Review of Systems  Review of Systems   Constitutional: Positive for fatigue. Negative for fever.   HENT: Negative for congestion, rhinorrhea and sore throat.    Eyes: Negative for pain.   Respiratory: Negative for cough.    Gastrointestinal: Negative for anorexia, diarrhea and vomiting.   Musculoskeletal: Negative for joint pain.   Skin: Positive for nail changes and rash.        Medications  Current Outpatient Medications on File Prior to Visit   Medication Sig Dispense Refill   • DULoxetine (CYMBALTA) 30 MG capsule Take 1 capsule by mouth Daily. Take with 60mg to make for total of 90mg daily. 30 capsule 11   • DULoxetine (CYMBALTA) 60 MG capsule Take 1 capsule by mouth Daily. Take with 30mg to make total of 90mg daily. 30 capsule 11  "  • hydrOXYzine pamoate (VISTARIL) 25 MG capsule TAKE ONE CAPSULE BY MOUTH THREE TIMES A DAY AS NEEDED FOR ANXIETY 30 capsule 2   • norethindrone-ethinyl estradiol-ferrous fumarate (LOESTIN 24 FE) 1-20 MG-MCG(24) per tablet Take 1 tablet by mouth Daily. 28 tablet 12     No current facility-administered medications on file prior to visit.        Allergies  Allergies   Allergen Reactions   • Wellbutrin [Bupropion] Irritability       PMH  Past Medical History:   Diagnosis Date   • Anxiety 2011   • Decreased body weight 2/13/2020   • Depression    • Headache    • Obesity 2013       Objective  Visit Vitals  /74   Pulse 75   Temp 96.8 °F (36 °C)   Resp 16   Ht 167 cm (65.75\")   Wt 114 kg (252 lb 3.2 oz)   LMP 10/27/2021 Comment: Birth Control   SpO2 98%   BMI 41.02 kg/m²        Physical Exam  Physical Exam  Vitals and nursing note reviewed.   Constitutional:       General: She is not in acute distress.     Appearance: She is well-developed. She is obese. She is not ill-appearing or toxic-appearing.   HENT:      Head: Normocephalic and atraumatic.   Eyes:      Conjunctiva/sclera: Conjunctivae normal.   Pulmonary:      Effort: Pulmonary effort is normal. No respiratory distress.   Skin:     General: Skin is warm and dry.      Findings: Rash (erythematous maculopapular rash across hands, wrists, lower back and lower legs. Most of the lesions on hands and wrists are excoriated.) present.   Neurological:      Mental Status: She is alert and oriented to person, place, and time. Mental status is at baseline.         Results  Results for orders placed or performed in visit on 02/13/20   Prolactin    Specimen: Blood   Result Value Ref Range    Prolactin 8.80 4.79 - 23.30 ng/mL   CBC (No Diff)    Specimen: Blood   Result Value Ref Range    WBC 10.97 (H) 3.40 - 10.80 10*3/mm3    RBC 5.28 3.77 - 5.28 10*6/mm3    Hemoglobin 14.7 12.0 - 15.9 g/dL    Hematocrit 43.6 34.0 - 46.6 %    MCV 82.6 79.0 - 97.0 fL    MCH 27.8 26.6 - 33.0 " pg    MCHC 33.7 31.5 - 35.7 g/dL    RDW 14.6 12.3 - 15.4 %    RDW-SD 43.8 37.0 - 54.0 fl    MPV 10.3 6.0 - 12.0 fL    Platelets 303 140 - 450 10*3/mm3   Comprehensive Metabolic Panel    Specimen: Blood   Result Value Ref Range    Glucose 81 65 - 99 mg/dL    BUN 11 6 - 20 mg/dL    Creatinine 0.68 0.57 - 1.00 mg/dL    Sodium 137 136 - 145 mmol/L    Potassium 4.3 3.5 - 5.2 mmol/L    Chloride 100 98 - 107 mmol/L    CO2 24.9 22.0 - 29.0 mmol/L    Calcium 9.8 8.6 - 10.5 mg/dL    Total Protein 7.9 6.0 - 8.5 g/dL    Albumin 4.50 3.50 - 5.20 g/dL    ALT (SGPT) 30 1 - 33 U/L    AST (SGOT) 24 1 - 32 U/L    Alkaline Phosphatase 58 39 - 117 U/L    Total Bilirubin 0.3 0.2 - 1.2 mg/dL    eGFR Non African Amer 102 >60 mL/min/1.73    Globulin 3.4 gm/dL    A/G Ratio 1.3 g/dL    BUN/Creatinine Ratio 16.2 7.0 - 25.0    Anion Gap 12.1 5.0 - 15.0 mmol/L   Hemoglobin A1c    Specimen: Blood   Result Value Ref Range    Hemoglobin A1C 5.60 4.80 - 5.60 %   Lipid Panel    Specimen: Blood   Result Value Ref Range    Total Cholesterol 202 (H) 0 - 200 mg/dL    Triglycerides 341 (H) 0 - 150 mg/dL    HDL Cholesterol 41 40 - 60 mg/dL    LDL Cholesterol  93 0 - 100 mg/dL    VLDL Cholesterol 68.2 (H) 5 - 40 mg/dL    LDL/HDL Ratio 2.26    TSH Rfx On Abnormal To Free T4    Specimen: Blood   Result Value Ref Range    TSH 2.210 0.270 - 4.200 uIU/mL        Assessment and Plan  Diagnoses and all orders for this visit:    Dermatitis  - Exact etiology not clear however differential includes contact dermatitis, scabies, bug bites. The latter two are less likely given her family members living in the same home have no rash and they are typically very contagious. Additionally nonexcoriated lesions do no appear typical for scabies.  - Will give medrol 40mg IM today. She will start 15d prednisone taper tomorrow. Okay to use topical steroid PRN to most itchy lesions but hope this will not be necessary given systemic steroids.  - Notify office if new lesions  continue to appear or rash does not improve on steroids.    Health Maintenance  - Pap smear: 9/2020 negative  - Mammogram: Start screening at age 40.  - Colonoscopy: Start screening at age 45.  - HCV: ordered  - Immunizations: COVID complete. Tdap 2/2020. Flu UTD.   - Depression screening: PHQ9 = 7 9/2021    Return if symptoms worsen or fail to improve.  Answers for HPI/ROS submitted by the patient on 11/16/2021  What is the primary reason for your visit?: Rash

## 2021-11-24 ENCOUNTER — PATIENT MESSAGE (OUTPATIENT)
Dept: INTERNAL MEDICINE | Facility: CLINIC | Age: 31
End: 2021-11-24

## 2021-11-24 DIAGNOSIS — B86 SCABIES: Primary | ICD-10-CM

## 2021-11-25 RX ORDER — PERMETHRIN 50 MG/G
CREAM TOPICAL
Qty: 30 G | Refills: 0 | Status: SHIPPED | OUTPATIENT
Start: 2021-11-25 | End: 2022-03-09

## 2021-11-25 NOTE — TELEPHONE ENCOUNTER
From: Arminda Alexis  To: Radha Rosales MD  Sent: 11/24/2021 9:25 AM EST  Subject: Rash    Hi Dr Rosales - I have been taking my steroids as prescribed and the rash is still here. It was especially bad last night. Is there something else we can try?

## 2022-03-09 ENCOUNTER — OFFICE VISIT (OUTPATIENT)
Dept: INTERNAL MEDICINE | Facility: CLINIC | Age: 32
End: 2022-03-09

## 2022-03-09 VITALS
HEART RATE: 88 BPM | HEIGHT: 66 IN | SYSTOLIC BLOOD PRESSURE: 142 MMHG | DIASTOLIC BLOOD PRESSURE: 84 MMHG | OXYGEN SATURATION: 99 % | BODY MASS INDEX: 41.82 KG/M2 | WEIGHT: 260.2 LBS | RESPIRATION RATE: 16 BRPM | TEMPERATURE: 97.1 F

## 2022-03-09 DIAGNOSIS — F41.9 ANXIETY: Primary | ICD-10-CM

## 2022-03-09 DIAGNOSIS — R03.0 ELEVATED BLOOD PRESSURE READING: ICD-10-CM

## 2022-03-09 DIAGNOSIS — F33.1 MODERATE EPISODE OF RECURRENT MAJOR DEPRESSIVE DISORDER: ICD-10-CM

## 2022-03-09 PROCEDURE — 99214 OFFICE O/P EST MOD 30 MIN: CPT | Performed by: INTERNAL MEDICINE

## 2022-03-09 RX ORDER — VENLAFAXINE HYDROCHLORIDE 37.5 MG/1
37.5 CAPSULE, EXTENDED RELEASE ORAL DAILY
Qty: 30 CAPSULE | Refills: 1 | Status: SHIPPED | OUTPATIENT
Start: 2022-03-09 | End: 2022-05-02 | Stop reason: SDUPTHER

## 2022-03-09 NOTE — PATIENT INSTRUCTIONS
Start effexor 37.5mg daily (take in AM).  Stop duloxetine 30mg daily. Continue duloxetine 60mg daily x1-2 weeks. Then cut back to 30mg daily x 1-2 weeks. Then stop. If needed can send in duloxetine 20mg.     [email or web address] for validated BP cuff.

## 2022-03-09 NOTE — PROGRESS NOTES
"Internal Medicine Follow Up    Chief Complaint  Arminda Alexis is a 31 y.o. female who presents today for follow up of chronic medical conditions outlined below.    Chief Complaint   Patient presents with   • Depression        HPI  Ms. Alexis comes in today for follow up. Rash resolved after she had house fumigated and treated for bed bugs. She notes that mood is worse. She has no energy, not able to work out, keep up with her home, diet poor, no motivation and feels that she \"just exists.\" She also notes increased frequency of anxiety attacks. She feels this is prompted by her depression. She remains on duloxetine 90mg daily which has been her regimen x2y. She also uses vistaril which helps when she has anxiety attacks because it makes her tired.       Review of Systems  Review of Systems   Constitutional: Positive for activity change and fatigue.   Skin: Negative for rash.   Psychiatric/Behavioral: Positive for decreased concentration, sleep disturbance, depressed mood and stress. Negative for self-injury, suicidal ideas and negative for hyperactivity. The patient is nervous/anxious.         Current Medications  Current Outpatient Medications on File Prior to Visit   Medication Sig Dispense Refill   • DULoxetine (CYMBALTA) 30 MG capsule Take 1 capsule by mouth Daily. Take with 60mg to make for total of 90mg daily. 30 capsule 11   • DULoxetine (CYMBALTA) 60 MG capsule Take 1 capsule by mouth Daily. Take with 30mg to make total of 90mg daily. 30 capsule 11   • hydrOXYzine pamoate (VISTARIL) 25 MG capsule TAKE ONE CAPSULE BY MOUTH THREE TIMES A DAY AS NEEDED FOR ANXIETY 30 capsule 2   • norethindrone-ethinyl estradiol-ferrous fumarate (LOESTIN 24 FE) 1-20 MG-MCG(24) per tablet Take 1 tablet by mouth Daily. 28 tablet 12   • permethrin (ELIMITE) 5 % cream Apply to skin from neck to soles of feet including beneath nails. Rinse off in shower after 8-14 hours. 30 g 0     No current facility-administered medications on file " "prior to visit.       Allergies  Allergies   Allergen Reactions   • Wellbutrin [Bupropion] Irritability       Objective  Visit Vitals  /84   Pulse 88   Temp 97.1 °F (36.2 °C)   Resp 16   Ht 167 cm (65.75\")   Wt 118 kg (260 lb 3.2 oz)   SpO2 99%   BMI 42.32 kg/m²        Physical Exam  Physical Exam  Vitals and nursing note reviewed.   Constitutional:       General: She is not in acute distress.     Appearance: She is well-developed. She is obese. She is not ill-appearing or toxic-appearing.   HENT:      Head: Normocephalic and atraumatic.   Eyes:      Conjunctiva/sclera: Conjunctivae normal.   Pulmonary:      Effort: Pulmonary effort is normal. No respiratory distress.   Skin:     General: Skin is warm and dry.      Findings: No rash.   Neurological:      Mental Status: She is alert and oriented to person, place, and time.   Psychiatric:         Mood and Affect: Mood and affect normal.         Speech: Speech normal.         Behavior: Behavior normal.         Thought Content: Thought content normal.         Judgment: Judgment normal.         Results  Results for orders placed or performed in visit on 02/13/20   Prolactin    Specimen: Blood   Result Value Ref Range    Prolactin 8.80 4.79 - 23.30 ng/mL   CBC (No Diff)    Specimen: Blood   Result Value Ref Range    WBC 10.97 (H) 3.40 - 10.80 10*3/mm3    RBC 5.28 3.77 - 5.28 10*6/mm3    Hemoglobin 14.7 12.0 - 15.9 g/dL    Hematocrit 43.6 34.0 - 46.6 %    MCV 82.6 79.0 - 97.0 fL    MCH 27.8 26.6 - 33.0 pg    MCHC 33.7 31.5 - 35.7 g/dL    RDW 14.6 12.3 - 15.4 %    RDW-SD 43.8 37.0 - 54.0 fl    MPV 10.3 6.0 - 12.0 fL    Platelets 303 140 - 450 10*3/mm3   Comprehensive Metabolic Panel    Specimen: Blood   Result Value Ref Range    Glucose 81 65 - 99 mg/dL    BUN 11 6 - 20 mg/dL    Creatinine 0.68 0.57 - 1.00 mg/dL    Sodium 137 136 - 145 mmol/L    Potassium 4.3 3.5 - 5.2 mmol/L    Chloride 100 98 - 107 mmol/L    CO2 24.9 22.0 - 29.0 mmol/L    Calcium 9.8 8.6 - 10.5 " mg/dL    Total Protein 7.9 6.0 - 8.5 g/dL    Albumin 4.50 3.50 - 5.20 g/dL    ALT (SGPT) 30 1 - 33 U/L    AST (SGOT) 24 1 - 32 U/L    Alkaline Phosphatase 58 39 - 117 U/L    Total Bilirubin 0.3 0.2 - 1.2 mg/dL    eGFR Non African Amer 102 >60 mL/min/1.73    Globulin 3.4 gm/dL    A/G Ratio 1.3 g/dL    BUN/Creatinine Ratio 16.2 7.0 - 25.0    Anion Gap 12.1 5.0 - 15.0 mmol/L   Hemoglobin A1c    Specimen: Blood   Result Value Ref Range    Hemoglobin A1C 5.60 4.80 - 5.60 %   Lipid Panel    Specimen: Blood   Result Value Ref Range    Total Cholesterol 202 (H) 0 - 200 mg/dL    Triglycerides 341 (H) 0 - 150 mg/dL    HDL Cholesterol 41 40 - 60 mg/dL    LDL Cholesterol  93 0 - 100 mg/dL    VLDL Cholesterol 68.2 (H) 5 - 40 mg/dL    LDL/HDL Ratio 2.26    TSH Rfx On Abnormal To Free T4    Specimen: Blood   Result Value Ref Range    TSH 2.210 0.270 - 4.200 uIU/mL        Assessment and Plan  Diagnoses and all orders for this visit:    Anxiety and Moderate episode of recurrent major depressive disorder (HCC)  - Worsened depressed mood PHQ9 = 16 and increased frequency of anxiety attacks. No SI.  - Will slowly wean duloxetine. Instructions provided to patient. Decrease today to 60mg and then after 1-2 weeks decrease to 30mg daily and then after 1-2 weeks stop. If symptoms of withdrawal present will taper slower, consider use of duloxetine 20mg.  - Start effexor 37.5mg daily. Monitor BP, noted to be mildly elevated today.  - Continue counseling  - Follow up in 6 weeks    Elevated blood pressure reading  - BP elevated today, previously normal to borderline. Has gained weight.  - Recommend daily BP monitoring, bring log to follow up     Health Maintenance  - Pap smear: 9/2020 negative  - Mammogram: Start screening at age 40.  - Colonoscopy: Start screening at age 45.  - HCV: negative  - Immunizations: COVID complete. Tdap 2/2020. Flu UTD.   - Depression screening: PHQ9 =  16 3/2022    Return in about 6 weeks (around 4/20/2022) for  Follow up mood.  Answers for HPI/ROS submitted by the patient on 3/7/2022  Please describe your symptoms.: depression worsening  Have you had these symptoms before?: Yes  How long have you been having these symptoms?: Greater than 2 weeks  Please list any medications you are currently taking for this condition.: cymbalta 90mg/daily  Please describe any probable cause for these symptoms. : i think my medication is no longer effective  What is the primary reason for your visit?: Other

## 2022-05-02 ENCOUNTER — TELEMEDICINE (OUTPATIENT)
Dept: INTERNAL MEDICINE | Facility: CLINIC | Age: 32
End: 2022-05-02

## 2022-05-02 DIAGNOSIS — R03.0 ELEVATED BLOOD PRESSURE READING: ICD-10-CM

## 2022-05-02 DIAGNOSIS — F33.1 MODERATE EPISODE OF RECURRENT MAJOR DEPRESSIVE DISORDER: ICD-10-CM

## 2022-05-02 DIAGNOSIS — F41.9 ANXIETY: Primary | ICD-10-CM

## 2022-05-02 PROCEDURE — 99214 OFFICE O/P EST MOD 30 MIN: CPT | Performed by: INTERNAL MEDICINE

## 2022-05-02 RX ORDER — VENLAFAXINE HYDROCHLORIDE 75 MG/1
75 CAPSULE, EXTENDED RELEASE ORAL DAILY
Qty: 90 CAPSULE | Refills: 1 | Status: SHIPPED | OUTPATIENT
Start: 2022-05-02 | End: 2022-08-02 | Stop reason: SDUPTHER

## 2022-05-02 NOTE — PROGRESS NOTES
Internal Medicine Follow Up    Chief Complaint  Arminda Alexis is a 31 y.o. female who presents today for follow up of chronic medical conditions outlined below.    Chief Complaint   Patient presents with   • Anxiety     Follow up   • Depression     Follow up        HPI  Ms. Alexis verbally consented to a telehealth visit. She was seen via telehealth using real-time video conferencing technology. This visit was requested because of the COVID-19 pandemic. She was located [facility] at work office, and provider was located at [facility] at [address] [place] [address]. The telehealth visit started 8:50AM and ended at 8:57AM EST.    She presented today for follow up on anxiety and depression. She has weaned off of cymbalta and on effexor monotherapy for about 3.5 weeks. She notes that she felt much better when on both medications and now feeling more down and fatigued, less energy. Still denies SI. Anxiety continues to be controlled with as needed hydroxyzine which she uses sparingly due to drowsiness. Has anxiety attacks twice weekly. She continues to see her counselor monthly. Visits are costly.     She has gotten a BP cuff and also went to  with URI recently. She notes BP both on home cuff and at  was well controlled.       Review of Systems  Review of Systems   Constitutional: Positive for fatigue.   Psychiatric/Behavioral: Positive for sleep disturbance and depressed mood. Negative for behavioral problems, self-injury and suicidal ideas.        Current Medications  Current Outpatient Medications on File Prior to Visit   Medication Sig Dispense Refill   • hydrOXYzine pamoate (VISTARIL) 25 MG capsule TAKE ONE CAPSULE BY MOUTH THREE TIMES A DAY AS NEEDED FOR ANXIETY 30 capsule 2   • venlafaxine XR (Effexor XR) 37.5 MG 24 hr capsule Take 1 capsule by mouth Daily. 30 capsule 1   • [DISCONTINUED] DULoxetine (CYMBALTA) 60 MG capsule Take 1 capsule by mouth Daily. Take with 30mg to make total of 90mg daily. 30  capsule 11     No current facility-administered medications on file prior to visit.       Allergies  Allergies   Allergen Reactions   • Wellbutrin [Bupropion] Irritability       Objective  There were no vitals taken for this visit.     Physical Exam  Physical Exam  Vitals and nursing note reviewed.   Constitutional:       General: She is not in acute distress.     Appearance: She is well-developed. She is not ill-appearing or toxic-appearing.   HENT:      Head: Normocephalic and atraumatic.   Eyes:      Conjunctiva/sclera: Conjunctivae normal.   Pulmonary:      Effort: Pulmonary effort is normal. No respiratory distress.   Neurological:      Mental Status: She is alert and oriented to person, place, and time. Mental status is at baseline.         Results  Results for orders placed or performed in visit on 02/13/20   Prolactin    Specimen: Blood   Result Value Ref Range    Prolactin 8.80 4.79 - 23.30 ng/mL   CBC (No Diff)    Specimen: Blood   Result Value Ref Range    WBC 10.97 (H) 3.40 - 10.80 10*3/mm3    RBC 5.28 3.77 - 5.28 10*6/mm3    Hemoglobin 14.7 12.0 - 15.9 g/dL    Hematocrit 43.6 34.0 - 46.6 %    MCV 82.6 79.0 - 97.0 fL    MCH 27.8 26.6 - 33.0 pg    MCHC 33.7 31.5 - 35.7 g/dL    RDW 14.6 12.3 - 15.4 %    RDW-SD 43.8 37.0 - 54.0 fl    MPV 10.3 6.0 - 12.0 fL    Platelets 303 140 - 450 10*3/mm3   Comprehensive Metabolic Panel    Specimen: Blood   Result Value Ref Range    Glucose 81 65 - 99 mg/dL    BUN 11 6 - 20 mg/dL    Creatinine 0.68 0.57 - 1.00 mg/dL    Sodium 137 136 - 145 mmol/L    Potassium 4.3 3.5 - 5.2 mmol/L    Chloride 100 98 - 107 mmol/L    CO2 24.9 22.0 - 29.0 mmol/L    Calcium 9.8 8.6 - 10.5 mg/dL    Total Protein 7.9 6.0 - 8.5 g/dL    Albumin 4.50 3.50 - 5.20 g/dL    ALT (SGPT) 30 1 - 33 U/L    AST (SGOT) 24 1 - 32 U/L    Alkaline Phosphatase 58 39 - 117 U/L    Total Bilirubin 0.3 0.2 - 1.2 mg/dL    eGFR Non African Amer 102 >60 mL/min/1.73    Globulin 3.4 gm/dL    A/G Ratio 1.3 g/dL     BUN/Creatinine Ratio 16.2 7.0 - 25.0    Anion Gap 12.1 5.0 - 15.0 mmol/L   Hemoglobin A1c    Specimen: Blood   Result Value Ref Range    Hemoglobin A1C 5.60 4.80 - 5.60 %   Lipid Panel    Specimen: Blood   Result Value Ref Range    Total Cholesterol 202 (H) 0 - 200 mg/dL    Triglycerides 341 (H) 0 - 150 mg/dL    HDL Cholesterol 41 40 - 60 mg/dL    LDL Cholesterol  93 0 - 100 mg/dL    VLDL Cholesterol 68.2 (H) 5 - 40 mg/dL    LDL/HDL Ratio 2.26    TSH Rfx On Abnormal To Free T4    Specimen: Blood   Result Value Ref Range    TSH 2.210 0.270 - 4.200 uIU/mL        Assessment and Plan  Diagnoses and all orders for this visit:    Moderate episode of recurrent major depressive disorder (HCC)  - Weaned off of cymbalta, on effexor 37.5mg daily. Mood had improved during time period when on effexor and cymbalta together but now more depressed.   - Will increase effexor to 75mg daily    Anxiety  - Stable, has anxiety attacks twice weekly which she manages with hydroxyzine when able. Limited due to drowsiness.  - Will anticipate improvement in anxiety with higher dose of effexor.    Elevated blood pressure reading  - Has home cuff and reports readings at home normal. Also had recent normal reading when in .     Health Maintenance  - Pap smear: 9/2020 negative  - Mammogram: Start screening at age 40.  - Colonoscopy: Start screening at age 45.  - HCV: negative  - Immunizations: COVID complete. Tdap 2/2020. Flu UTD.   - Depression screening: PHQ9 =  16 3/2022         Return in about 4 weeks (around 5/30/2022) for Video visit.     Today I have spent a total of 13 minutes caring for Arminda Alexis.  This includes time spent preparing for the visit, reviewing tests, performing a medically appropriate examination and/or evaluation , counseling and educating the patient/family/caregiver, ordering medications, tests, or procedures and documenting information in the medical record.    Answers for HPI/ROS submitted by the patient on  4/27/2022  Please describe your symptoms.: I switched from cymbalta to effexor. I am noticing a difference but I think I need to increase my dosage for effexor as it is not as effective as i need it to be.  Have you had these symptoms before?: No  How long have you been having these symptoms?: 1-2 weeks  What is the primary reason for your visit?: Other

## 2022-05-26 ENCOUNTER — HOSPITAL ENCOUNTER (OUTPATIENT)
Dept: MAMMOGRAPHY | Facility: HOSPITAL | Age: 32
Discharge: HOME | End: 2022-05-26
Admitting: NURSE PRACTITIONER

## 2022-05-26 DIAGNOSIS — R92.8 ABNORMAL MAMMOGRAM: ICD-10-CM

## 2022-05-26 PROCEDURE — 77061 BREAST TOMOSYNTHESIS UNI: CPT | Performed by: RADIOLOGY

## 2022-05-26 PROCEDURE — 77065 DX MAMMO INCL CAD UNI: CPT | Performed by: RADIOLOGY

## 2022-05-26 PROCEDURE — 77065 DX MAMMO INCL CAD UNI: CPT

## 2022-08-02 DIAGNOSIS — F33.1 MODERATE EPISODE OF RECURRENT MAJOR DEPRESSIVE DISORDER: ICD-10-CM

## 2022-08-02 DIAGNOSIS — F41.9 ANXIETY: ICD-10-CM

## 2022-08-02 RX ORDER — VENLAFAXINE HYDROCHLORIDE 37.5 MG/1
CAPSULE, EXTENDED RELEASE ORAL
Qty: 30 CAPSULE | Refills: 1 | OUTPATIENT
Start: 2022-08-02

## 2022-08-02 RX ORDER — VENLAFAXINE HYDROCHLORIDE 75 MG/1
75 CAPSULE, EXTENDED RELEASE ORAL DAILY
Qty: 90 CAPSULE | Refills: 1 | Status: SHIPPED | OUTPATIENT
Start: 2022-08-02 | End: 2022-09-14 | Stop reason: SDUPTHER

## 2022-09-14 ENCOUNTER — OFFICE VISIT (OUTPATIENT)
Dept: INTERNAL MEDICINE | Facility: CLINIC | Age: 32
End: 2022-09-14

## 2022-09-14 VITALS
TEMPERATURE: 98.3 F | SYSTOLIC BLOOD PRESSURE: 124 MMHG | BODY MASS INDEX: 39.05 KG/M2 | WEIGHT: 243 LBS | HEIGHT: 66 IN | OXYGEN SATURATION: 99 % | HEART RATE: 71 BPM | DIASTOLIC BLOOD PRESSURE: 80 MMHG

## 2022-09-14 DIAGNOSIS — F33.1 MODERATE EPISODE OF RECURRENT MAJOR DEPRESSIVE DISORDER: ICD-10-CM

## 2022-09-14 DIAGNOSIS — B35.1 ONYCHOMYCOSIS: ICD-10-CM

## 2022-09-14 DIAGNOSIS — E66.09 CLASS 2 OBESITY DUE TO EXCESS CALORIES WITHOUT SERIOUS COMORBIDITY WITH BODY MASS INDEX (BMI) OF 39.0 TO 39.9 IN ADULT: ICD-10-CM

## 2022-09-14 DIAGNOSIS — Z11.59 ENCOUNTER FOR HEPATITIS C SCREENING TEST FOR LOW RISK PATIENT: ICD-10-CM

## 2022-09-14 DIAGNOSIS — Z00.00 ANNUAL PHYSICAL EXAM: Primary | ICD-10-CM

## 2022-09-14 DIAGNOSIS — F41.9 ANXIETY: ICD-10-CM

## 2022-09-14 DIAGNOSIS — Z23 NEED FOR INFLUENZA VACCINATION: ICD-10-CM

## 2022-09-14 DIAGNOSIS — L73.2 HIDRADENITIS SUPPURATIVA: ICD-10-CM

## 2022-09-14 PROCEDURE — 99395 PREV VISIT EST AGE 18-39: CPT | Performed by: INTERNAL MEDICINE

## 2022-09-14 PROCEDURE — 99214 OFFICE O/P EST MOD 30 MIN: CPT | Performed by: INTERNAL MEDICINE

## 2022-09-14 PROCEDURE — 90686 IIV4 VACC NO PRSV 0.5 ML IM: CPT | Performed by: INTERNAL MEDICINE

## 2022-09-14 PROCEDURE — 90471 IMMUNIZATION ADMIN: CPT | Performed by: INTERNAL MEDICINE

## 2022-09-14 RX ORDER — CLINDAMYCIN PHOSPHATE 10 MG/G
1 AEROSOL, FOAM TOPICAL 2 TIMES DAILY
Qty: 100 G | Refills: 1 | Status: SHIPPED | OUTPATIENT
Start: 2022-09-14 | End: 2022-09-15

## 2022-09-14 RX ORDER — VENLAFAXINE HYDROCHLORIDE 150 MG/1
150 CAPSULE, EXTENDED RELEASE ORAL DAILY
Qty: 90 CAPSULE | Refills: 1 | Status: SHIPPED | OUTPATIENT
Start: 2022-09-14 | End: 2023-03-13

## 2022-09-14 NOTE — PROGRESS NOTES
Internal Medicine Annual Exam  Arminda Llanos is a 32 y.o. female who presents today for an annual exam and with concerns as outlined below.    Chief Complaint  Chief Complaint   Patient presents with   • Annual Exam   • Nail Problem   • Rash   • Depression        HPI  Ms. Llanos comes in today for her physical. In regards to preventative care she is up to date with her dental and vision exams. She has GYN exam in 2 weeks. She has had her primary COVID19 vaccines as well as first booster and recently had COVID19 infection. She does plan to get her bivalent booster. She would like her flu shot today. Denies use of tobacco, ecigarettes, illicit drugs, and alcohol. She is on effexor for anxiety and depression. Rarely using hydroxyzine but keeps it on hand. She does note at least one day a week where she is fatigued and lacks motivation. She denies SI. She would like to try a higher dose of effexor. She notes chronic recurrent skin infections in axilla and sides of breasts. She has also previously had them in her groin. This began as a child. She has used a topical abx foam previously. She also notes at least a year of having a thick, discolored and brittle nail on her R great toe.         Review of Systems  Review of Systems   Constitutional: Positive for fatigue. Negative for unexpected weight loss.   HENT: Negative for dental problem and hearing loss.    Eyes: Negative.    Respiratory: Negative.    Cardiovascular: Negative.    Gastrointestinal: Negative.    Musculoskeletal: Negative.    Skin: Positive for skin lesions.        +nail discolored, brittle, thick   Neurological: Negative.    Psychiatric/Behavioral: Positive for dysphoric mood. Negative for self-injury, sleep disturbance, suicidal ideas and depressed mood. The patient is not nervous/anxious.         Past Medical History  Past Medical History:   Diagnosis Date   • Anxiety 2011   • Decreased body weight 02/13/2020   • Depression    • Headache    •  Obesity    • Urinary tract infection         Surgical History  Past Surgical History:   Procedure Laterality Date   • TONSILLECTOMY Bilateral 2011   • WISDOM TOOTH EXTRACTION Bilateral         Family History  Family History   Problem Relation Age of Onset   • Breast cancer Mother 50        s/p double mastectomy   • Depression Mother    • Anxiety disorder Mother    • Cancer Mother         breast cancer   • Diabetes Father    • Diabetes Maternal Grandmother    • Diabetes Maternal Grandfather    • Brain cancer Maternal Grandfather    • Cancer Maternal Grandfather         brain cancer   • Diabetes Paternal Grandmother    • Diabetes Paternal Grandfather    • Depression Sister    • Anxiety disorder Sister    • Liver disease Half-Brother         fatty liver   • Colon cancer Neg Hx    • Osteoporosis Neg Hx         Social History  Social History     Socioeconomic History   • Marital status: Other   Tobacco Use   • Smoking status: Former Smoker     Packs/day: 0.50     Years: 7.00     Pack years: 3.50     Types: Cigarettes     Quit date: 2016     Years since quittin.1   • Smokeless tobacco: Never Used   • Tobacco comment:  quit   Vaping Use   • Vaping Use: Never used   Substance and Sexual Activity   • Alcohol use: Never   • Drug use: Never   • Sexual activity: Yes     Partners: Male     Birth control/protection: Coitus interruptus        Current Medications  Current Outpatient Medications on File Prior to Visit   Medication Sig Dispense Refill   • hydrOXYzine pamoate (VISTARIL) 25 MG capsule TAKE ONE CAPSULE BY MOUTH THREE TIMES A DAY AS NEEDED FOR ANXIETY 30 capsule 2   • VITAMIN D PO Take  by mouth.     • VITAMIN E PO Take  by mouth.     • [DISCONTINUED] venlafaxine XR (Effexor XR) 75 MG 24 hr capsule Take 1 capsule by mouth Daily. 90 capsule 1     No current facility-administered medications on file prior to visit.       Allergies  Allergies   Allergen Reactions   • Wellbutrin [Bupropion] Irritability  "       Objective  Visit Vitals  /80   Pulse 71   Temp 98.3 °F (36.8 °C)   Ht 167.6 cm (66\")   Wt 110 kg (243 lb)   SpO2 99%   BMI 39.22 kg/m²        Physical Exam  Physical Exam  Vitals and nursing note reviewed.   Constitutional:       General: She is not in acute distress.     Appearance: She is well-developed. She is obese. She is not ill-appearing, toxic-appearing or diaphoretic.   HENT:      Head: Normocephalic and atraumatic.      Right Ear: Tympanic membrane, ear canal and external ear normal.      Left Ear: Tympanic membrane, ear canal and external ear normal.      Nose: Nose normal.      Mouth/Throat:      Pharynx: No oropharyngeal exudate.   Eyes:      General: No scleral icterus.     Conjunctiva/sclera: Conjunctivae normal.      Pupils: Pupils are equal, round, and reactive to light.   Cardiovascular:      Rate and Rhythm: Normal rate and regular rhythm.      Heart sounds: Normal heart sounds. No murmur heard.  Pulmonary:      Effort: Pulmonary effort is normal. No respiratory distress.      Breath sounds: Normal breath sounds.   Abdominal:      General: Bowel sounds are normal. There is no distension.      Palpations: Abdomen is soft. There is no mass.      Tenderness: There is no abdominal tenderness.   Musculoskeletal:         General: No deformity.      Cervical back: Neck supple.      Right lower leg: No edema.      Left lower leg: No edema.   Feet:      Right foot:      Toenail Condition: Fungal disease present.     Comments: All toenails painted with exception of R great toenail which is cut to [facility] down nailbed, yellow discoloration along edge of remaining nail.  Lymphadenopathy:      Cervical: No cervical adenopathy.   Skin:     General: Skin is warm and dry.      Findings: Lesion (small inflamed lesions along sides of breast) present. No rash.   Neurological:      Mental Status: She is alert and oriented to person, place, and time. Mental status is at baseline.      Gait: Gait normal. "   Psychiatric:         Mood and Affect: Mood normal.         Behavior: Behavior normal.         Thought Content: Thought content normal.         Judgment: Judgment normal.          Results  Results for orders placed or performed in visit on 02/13/20   Prolactin    Specimen: Blood   Result Value Ref Range    Prolactin 8.80 4.79 - 23.30 ng/mL   CBC (No Diff)    Specimen: Blood   Result Value Ref Range    WBC 10.97 (H) 3.40 - 10.80 10*3/mm3    RBC 5.28 3.77 - 5.28 10*6/mm3    Hemoglobin 14.7 12.0 - 15.9 g/dL    Hematocrit 43.6 34.0 - 46.6 %    MCV 82.6 79.0 - 97.0 fL    MCH 27.8 26.6 - 33.0 pg    MCHC 33.7 31.5 - 35.7 g/dL    RDW 14.6 12.3 - 15.4 %    RDW-SD 43.8 37.0 - 54.0 fl    MPV 10.3 6.0 - 12.0 fL    Platelets 303 140 - 450 10*3/mm3   Comprehensive Metabolic Panel    Specimen: Blood   Result Value Ref Range    Glucose 81 65 - 99 mg/dL    BUN 11 6 - 20 mg/dL    Creatinine 0.68 0.57 - 1.00 mg/dL    Sodium 137 136 - 145 mmol/L    Potassium 4.3 3.5 - 5.2 mmol/L    Chloride 100 98 - 107 mmol/L    CO2 24.9 22.0 - 29.0 mmol/L    Calcium 9.8 8.6 - 10.5 mg/dL    Total Protein 7.9 6.0 - 8.5 g/dL    Albumin 4.50 3.50 - 5.20 g/dL    ALT (SGPT) 30 1 - 33 U/L    AST (SGOT) 24 1 - 32 U/L    Alkaline Phosphatase 58 39 - 117 U/L    Total Bilirubin 0.3 0.2 - 1.2 mg/dL    eGFR Non African Amer 102 >60 mL/min/1.73    Globulin 3.4 gm/dL    A/G Ratio 1.3 g/dL    BUN/Creatinine Ratio 16.2 7.0 - 25.0    Anion Gap 12.1 5.0 - 15.0 mmol/L   Hemoglobin A1c    Specimen: Blood   Result Value Ref Range    Hemoglobin A1C 5.60 4.80 - 5.60 %   Lipid Panel    Specimen: Blood   Result Value Ref Range    Total Cholesterol 202 (H) 0 - 200 mg/dL    Triglycerides 341 (H) 0 - 150 mg/dL    HDL Cholesterol 41 40 - 60 mg/dL    LDL Cholesterol  93 0 - 100 mg/dL    VLDL Cholesterol 68.2 (H) 5 - 40 mg/dL    LDL/HDL Ratio 2.26    TSH Rfx On Abnormal To Free T4    Specimen: Blood   Result Value Ref Range    TSH 2.210 0.270 - 4.200 uIU/mL        Assessment and  Plan  Diagnoses and all orders for this visit:    Annual physical exam  - Counseling was given to patient for the following topics:  disease prevention and importance of immunizations, including risks and benefits. Also discussed the importance of regular dental and vision care.  -     CBC (No Diff); Future  -     Comprehensive Metabolic Panel; Future  -     Lipid Panel; Future  -     Hemoglobin A1c; Future    Anxiety  - Stable, on effexor and no longer taking hydroxyzine however does keep it on hand.    Moderate episode of recurrent major depressive disorder (HCC)  - Improved on effexor 75mg daily but still with at least 1 day a week of anhedonia and fatigue.  - Will increase effexor to 150mg daily    Onychomycosis  - Declines topical. Potentially interested in oral terbinafine.  - CMP today prior to initiation. Discussed need for monitoring liver function also at 6 weeks.    Hidradenitis suppurativa  - Start topical clindamycin BID    Class 2 obesity due to excess calories without serious comorbidity with body mass index (BMI) of 39.0 to 39.9 in adult  - Class 2 Severe Obesity (BMI >=35 and <=39.9). Obesity-related health conditions include the following: none. Obesity is improving with lifestyle modifications. BMI is is above average; BMI management plan is completed. We discussed continue exercise and healthy well balanced diet.    Encounter for hepatitis C screening test for low risk patient  -     Hepatitis C Antibody; Future    Need for influenza vaccination  -     FluLaval/Fluarix/Fluzone >6 Months     Health Maintenance   Topic Date Due   • HEPATITIS C SCREENING  Never done   • COVID-19 Vaccine (3 - Booster for Pfizer series) 09/14/2021   • ANNUAL PHYSICAL  09/09/2022   • INFLUENZA VACCINE  10/01/2022   • PAP SMEAR  09/02/2023   • TDAP/TD VACCINES (2 - Td or Tdap) 02/13/2030   • Pneumococcal Vaccine 0-64  Aged Out     Health Maintenance  - Pap smear: 9/2020 negative, has GYN  - Mammogram: Start screening at  age 40.  - Colonoscopy: Start screening at age 45.  - HCV: negative  - Immunizations: COVID booster discussed. Tdap 2/2020. Flu today.   - Depression screening: PHQ9 =  16 3/2022    Return in about 3 months (around 12/14/2022) for Follow up mood, nail, HS, 1 year for annual.

## 2022-09-15 ENCOUNTER — LAB (OUTPATIENT)
Dept: LAB | Facility: HOSPITAL | Age: 32
End: 2022-09-15

## 2022-09-15 ENCOUNTER — PATIENT MESSAGE (OUTPATIENT)
Dept: INTERNAL MEDICINE | Facility: CLINIC | Age: 32
End: 2022-09-15

## 2022-09-15 DIAGNOSIS — L73.2 HIDRADENITIS SUPPURATIVA: Primary | ICD-10-CM

## 2022-09-15 DIAGNOSIS — Z11.59 ENCOUNTER FOR HEPATITIS C SCREENING TEST FOR LOW RISK PATIENT: ICD-10-CM

## 2022-09-15 DIAGNOSIS — Z00.00 ANNUAL PHYSICAL EXAM: ICD-10-CM

## 2022-09-15 LAB
ALBUMIN SERPL-MCNC: 4.5 G/DL (ref 3.5–5.2)
ALBUMIN/GLOB SERPL: 1.7 G/DL
ALP SERPL-CCNC: 64 U/L (ref 39–117)
ALT SERPL W P-5'-P-CCNC: 10 U/L (ref 1–33)
ANION GAP SERPL CALCULATED.3IONS-SCNC: 12 MMOL/L (ref 5–15)
AST SERPL-CCNC: 14 U/L (ref 1–32)
BILIRUB SERPL-MCNC: 0.3 MG/DL (ref 0–1.2)
BUN SERPL-MCNC: 7 MG/DL (ref 6–20)
BUN/CREAT SERPL: 9.6 (ref 7–25)
CALCIUM SPEC-SCNC: 9.4 MG/DL (ref 8.6–10.5)
CHLORIDE SERPL-SCNC: 104 MMOL/L (ref 98–107)
CHOLEST SERPL-MCNC: 167 MG/DL (ref 0–200)
CO2 SERPL-SCNC: 24 MMOL/L (ref 22–29)
CREAT SERPL-MCNC: 0.73 MG/DL (ref 0.57–1)
DEPRECATED RDW RBC AUTO: 40.4 FL (ref 37–54)
EGFRCR SERPLBLD CKD-EPI 2021: 112.2 ML/MIN/1.73
ERYTHROCYTE [DISTWIDTH] IN BLOOD BY AUTOMATED COUNT: 13.7 % (ref 12.3–15.4)
GLOBULIN UR ELPH-MCNC: 2.7 GM/DL
GLUCOSE SERPL-MCNC: 115 MG/DL (ref 65–99)
HBA1C MFR BLD: 5.2 % (ref 4.8–5.6)
HCT VFR BLD AUTO: 40.7 % (ref 34–46.6)
HCV AB SER DONR QL: NORMAL
HDLC SERPL-MCNC: 36 MG/DL (ref 40–60)
HGB BLD-MCNC: 13.6 G/DL (ref 12–15.9)
LDLC SERPL CALC-MCNC: 106 MG/DL (ref 0–100)
LDLC/HDLC SERPL: 2.86 {RATIO}
MCH RBC QN AUTO: 27.4 PG (ref 26.6–33)
MCHC RBC AUTO-ENTMCNC: 33.4 G/DL (ref 31.5–35.7)
MCV RBC AUTO: 81.9 FL (ref 79–97)
PLATELET # BLD AUTO: 275 10*3/MM3 (ref 140–450)
PMV BLD AUTO: 10.4 FL (ref 6–12)
POTASSIUM SERPL-SCNC: 4.3 MMOL/L (ref 3.5–5.2)
PROT SERPL-MCNC: 7.2 G/DL (ref 6–8.5)
RBC # BLD AUTO: 4.97 10*6/MM3 (ref 3.77–5.28)
SODIUM SERPL-SCNC: 140 MMOL/L (ref 136–145)
TRIGL SERPL-MCNC: 141 MG/DL (ref 0–150)
VLDLC SERPL-MCNC: 25 MG/DL (ref 5–40)
WBC NRBC COR # BLD: 8.27 10*3/MM3 (ref 3.4–10.8)

## 2022-09-15 PROCEDURE — 86803 HEPATITIS C AB TEST: CPT

## 2022-09-15 PROCEDURE — 83036 HEMOGLOBIN GLYCOSYLATED A1C: CPT

## 2022-09-15 PROCEDURE — 80053 COMPREHEN METABOLIC PANEL: CPT

## 2022-09-15 PROCEDURE — 80061 LIPID PANEL: CPT

## 2022-09-15 PROCEDURE — 85027 COMPLETE CBC AUTOMATED: CPT

## 2022-09-15 RX ORDER — CLINDAMYCIN PHOSPHATE 11.9 MG/ML
SOLUTION TOPICAL 2 TIMES DAILY
Qty: 60 ML | Refills: 5 | Status: SHIPPED | OUTPATIENT
Start: 2022-09-15 | End: 2022-09-16 | Stop reason: SDUPTHER

## 2022-09-16 RX ORDER — CLINDAMYCIN PHOSPHATE 11.9 MG/ML
SOLUTION TOPICAL 2 TIMES DAILY
Qty: 60 ML | Refills: 5 | Status: SHIPPED | OUTPATIENT
Start: 2022-09-16

## 2022-09-16 NOTE — TELEPHONE ENCOUNTER
From: Arminda Llanos  To: Radha Rosales MD  Sent: 9/15/2022 12:10 PM EDT  Subject: Clindamycin Foam    Hi Dr. Rosales -    I was going to  my foam for my situation in my armpits and it looks like it's going to cost nearly $400. Is there maybe a cheaper alternative? I hate to ask but I just don't have that. Thank you!

## 2022-09-28 ENCOUNTER — OFFICE VISIT (OUTPATIENT)
Dept: OBSTETRICS AND GYNECOLOGY | Facility: CLINIC | Age: 32
End: 2022-09-28

## 2022-09-28 VITALS
BODY MASS INDEX: 39.87 KG/M2 | RESPIRATION RATE: 16 BRPM | DIASTOLIC BLOOD PRESSURE: 80 MMHG | WEIGHT: 247 LBS | SYSTOLIC BLOOD PRESSURE: 124 MMHG

## 2022-09-28 DIAGNOSIS — Z01.419 ENCOUNTER FOR GYNECOLOGICAL EXAMINATION WITHOUT ABNORMAL FINDING: Primary | ICD-10-CM

## 2022-09-28 DIAGNOSIS — N60.11 FIBROCYSTIC BREAST CHANGES, BILATERAL: ICD-10-CM

## 2022-09-28 DIAGNOSIS — Z80.3 FAMILY HISTORY OF BREAST CANCER: ICD-10-CM

## 2022-09-28 DIAGNOSIS — N60.12 FIBROCYSTIC BREAST CHANGES, BILATERAL: ICD-10-CM

## 2022-09-28 DIAGNOSIS — Z30.09 ENCOUNTER FOR OTHER GENERAL COUNSELING OR ADVICE ON CONTRACEPTION: ICD-10-CM

## 2022-09-28 PROCEDURE — 99395 PREV VISIT EST AGE 18-39: CPT | Performed by: NURSE PRACTITIONER

## 2022-09-28 NOTE — PROGRESS NOTES
Annual Visit     Patient Name: Arminda Llanos  : 1990   MRN: [id number]   Care Team: Patient Care Team:  Radha Rosales MD as PCP - General (Internal Medicine)  Fabiola Lozano APRN as Nurse Practitioner (Nurse Practitioner)    Chief Complaint:    Chief Complaint   Patient presents with   • Annual Exam       HPI: Arminda Llanos is a 32 y.o. year old  presenting to be seen for her gynecologic exam.   Pap smear 2020 WNL     Paraguard removed last yr d/t heavier and more painful periods with method   Periods are much improved since method removed   Monthly without c/o   LMP 22     Claudia Fe prescribed last yr - she developed hives with method   No other changes in medications or new products that would have caused hives during that time   Has used Nuva ring in the past with mood changes   Hesitant about trying another method   Currently faithful with condom use     Left breast mass noted on CBE last yr   Bilateral dx mammogram done with left breast u/s in 2021 birads 3 - recommend 6 month f/u   Left breast dx mammogram 2022 birads 2 - begin screening mammogram at age 40   Mother with hx of bilateral breast cancer at age 50   Her mother is BRCA 1 positive       Subjective      /80   Resp 16   Wt 112 kg (247 lb)   LMP 2022   Breastfeeding No   BMI 39.87 kg/m²     BMI reviewed: Body mass index is 39.87 kg/m².      Objective     Physical Exam     Neuro: alert and oriented to person, place and time   General:  alert; cooperative; well developed; well nourished   Skin:  No suspicious lesions seen   Thyroid: normal to inspection and palpation   Lungs:  breathing is unlabored  clear to auscultation bilaterally   Heart:  regular rate and rhythm, S1, S2 normal, no murmur, click, rub or gallop  normal apical impulse   Breasts:  Examined in supine position  Symmetric without masses or skin dimpling  Nipples normal without inversion, lesions or discharge  There are  no palpable axillary nodes  Fibrocystic changes are present both breasts without a discrete mass   Abdomen: soft, non-tender; no masses  no umbilical or inguinal hernias are present  no hepato-splenomegaly   Pelvis: Clinical staff was present for exam  External genitalia:  normal appearance of the external genitalia including Bartholin's and [place]'s glands.  :  urethral meatus normal;  Vaginal:  normal pink mucosa without prolapse or lesions.  Cervix:  normal appearance.  Uterus:  normal size, shape and consistency.  Adnexa:  normal bimanual exam of the adnexa.  Rectal:  digital rectal exam not performed; anus visually normal appearing.         Assessment / Plan      Assessment  Problems Addressed This Visit    ICD-10-CM ICD-9-CM   1. Encounter for gynecological examination without abnormal finding  Z01.419 V72.31   2. Fibrocystic breast changes, bilateral  N60.11 610.1    N60.12    3. Encounter for other general counseling or advice on contraception  Z30.09 V25.09   4. Family history of breast cancer  Z80.3 V16.3       Plan    Pap smear not indicated   Discussed monthly SBEs and fibrocystic breast changes   Plan mammogram to begin at age 40     Discussed genetic testing/counseling either Sema 4 or referral to genetic counseling   She would like to check insurance coverage first and let me know if she would like to proceed before next yr   D/t mother's hx, discussed testing is recommended for her     Discussed contraceptive options - she will cont with faithful condom use at this time   Discussed other JENNY options with different progesterone   If she would like to try another method before next yr, she will let me know     AV 1 yr         19 to 39: Counseling/Anticipatory Guidance Discussed: family planning/contraception and breast cancer and self breast exams    Follow Up  Return in about 1 year (around 9/28/2023) for Annual physical.  Patient was given instructions and counseling regarding her condition or for  health maintenance advice. Please see specific information pulled into the AVS if appropriate.     Fabiola Lozano, TIM  September 28, 2022  16:20 EDT

## 2023-03-13 DIAGNOSIS — F41.9 ANXIETY: ICD-10-CM

## 2023-03-13 DIAGNOSIS — F33.1 MODERATE EPISODE OF RECURRENT MAJOR DEPRESSIVE DISORDER: ICD-10-CM

## 2023-03-13 RX ORDER — VENLAFAXINE HYDROCHLORIDE 150 MG/1
CAPSULE, EXTENDED RELEASE ORAL
Qty: 30 CAPSULE | Refills: 0 | Status: SHIPPED | OUTPATIENT
Start: 2023-03-13

## 2023-04-17 DIAGNOSIS — F33.1 MODERATE EPISODE OF RECURRENT MAJOR DEPRESSIVE DISORDER: ICD-10-CM

## 2023-04-17 DIAGNOSIS — F41.9 ANXIETY: ICD-10-CM

## 2023-04-17 RX ORDER — VENLAFAXINE HYDROCHLORIDE 150 MG/1
CAPSULE, EXTENDED RELEASE ORAL
Qty: 30 CAPSULE | Refills: 0 | Status: SHIPPED | OUTPATIENT
Start: 2023-04-17

## 2023-04-17 NOTE — TELEPHONE ENCOUNTER
HUB TO READ     Left message with patient to schedule a follow up within the next month for future refills. Office number given. Please schedule an appointment next month for patient.

## 2023-04-25 ENCOUNTER — OFFICE VISIT (OUTPATIENT)
Dept: INTERNAL MEDICINE | Facility: CLINIC | Age: 33
End: 2023-04-25
Payer: COMMERCIAL

## 2023-04-25 VITALS
TEMPERATURE: 98.2 F | WEIGHT: 252 LBS | BODY MASS INDEX: 40.5 KG/M2 | SYSTOLIC BLOOD PRESSURE: 132 MMHG | DIASTOLIC BLOOD PRESSURE: 86 MMHG | HEIGHT: 66 IN | OXYGEN SATURATION: 99 % | HEART RATE: 65 BPM

## 2023-04-25 DIAGNOSIS — Z23 NEED FOR VACCINATION: ICD-10-CM

## 2023-04-25 DIAGNOSIS — F41.9 ANXIETY: Primary | ICD-10-CM

## 2023-04-25 DIAGNOSIS — E66.01 CLASS 3 SEVERE OBESITY DUE TO EXCESS CALORIES WITHOUT SERIOUS COMORBIDITY WITH BODY MASS INDEX (BMI) OF 40.0 TO 44.9 IN ADULT: ICD-10-CM

## 2023-04-25 DIAGNOSIS — F33.1 MODERATE EPISODE OF RECURRENT MAJOR DEPRESSIVE DISORDER: ICD-10-CM

## 2023-04-25 DIAGNOSIS — R03.0 ELEVATED BLOOD PRESSURE READING: ICD-10-CM

## 2023-04-25 PROBLEM — E66.813 CLASS 3 SEVERE OBESITY DUE TO EXCESS CALORIES WITH SERIOUS COMORBIDITY AND BODY MASS INDEX (BMI) OF 40.0 TO 44.9 IN ADULT: Status: ACTIVE | Noted: 2020-09-02

## 2023-04-25 PROCEDURE — 99214 OFFICE O/P EST MOD 30 MIN: CPT | Performed by: INTERNAL MEDICINE

## 2023-04-25 RX ORDER — DULOXETIN HYDROCHLORIDE 20 MG/1
20 CAPSULE, DELAYED RELEASE ORAL DAILY
Qty: 30 CAPSULE | Refills: 1 | Status: SHIPPED | OUTPATIENT
Start: 2023-04-25

## 2023-04-25 RX ORDER — VENLAFAXINE HYDROCHLORIDE 37.5 MG/1
37.5 CAPSULE, EXTENDED RELEASE ORAL DAILY
Qty: 14 CAPSULE | Refills: 0 | Status: SHIPPED | OUTPATIENT
Start: 2023-04-25 | End: 2023-05-09

## 2023-04-25 RX ORDER — VENLAFAXINE HYDROCHLORIDE 75 MG/1
75 CAPSULE, EXTENDED RELEASE ORAL DAILY
Qty: 14 CAPSULE | Refills: 0 | Status: SHIPPED | OUTPATIENT
Start: 2023-04-25 | End: 2023-05-09

## 2023-04-25 NOTE — PROGRESS NOTES
"Internal Medicine Follow Up    Chief Complaint  Arminda Llanos is a 32 y.o. female who presents today for follow up of chronic medical conditions outlined below.    Chief Complaint   Patient presents with   • Medication Review     Effects of taking Effexor        HPI  Ms. Llanos comes in today for overdue follow up on depression and anxiety. Has been more depressed for several months. No motivation. No SI. Feels that she did better when on cymbalta but this was stopped after losing efficacy. She has a counselor but does not keep close follow up. May have a counseling session once every few months. She has regained weight. No motivation to exercise.        Review of Systems  Review of Systems   Constitutional: Positive for activity change, appetite change and fatigue. Negative for unexpected weight gain.   Psychiatric/Behavioral: Positive for decreased concentration, sleep disturbance and depressed mood. Negative for self-injury and suicidal ideas. The patient is nervous/anxious.         Current Medications  Current Outpatient Medications on File Prior to Visit   Medication Sig Dispense Refill   • hydrOXYzine pamoate (VISTARIL) 25 MG capsule TAKE ONE CAPSULE BY MOUTH THREE TIMES A DAY AS NEEDED FOR ANXIETY 30 capsule 2   • VITAMIN D PO Take  by mouth.     • VITAMIN E PO Take  by mouth.     • [DISCONTINUED] venlafaxine XR (EFFEXOR-XR) 150 MG 24 hr capsule TAKE ONE CAPSULE BY MOUTH DAILY 30 capsule 0   • [DISCONTINUED] clindamycin (Cleocin-T) 1 % external solution Apply  topically to the appropriate area as directed 2 (Two) Times a Day. 60 mL 5     No current facility-administered medications on file prior to visit.       Allergies  Allergies   Allergen Reactions   • Wellbutrin [Bupropion] Irritability       Objective  Visit Vitals  /86   Pulse 65   Temp 98.2 °F (36.8 °C)   Ht 167.6 cm (66\")   Wt 114 kg (252 lb)   SpO2 99%   BMI 40.67 kg/m²        Physical Exam  Physical Exam  Vitals and nursing note " reviewed.   Constitutional:       General: She is not in acute distress.     Appearance: She is well-developed. She is obese. She is not ill-appearing or toxic-appearing.   HENT:      Head: Normocephalic and atraumatic.   Eyes:      Conjunctiva/sclera: Conjunctivae normal.   Pulmonary:      Effort: Pulmonary effort is normal. No respiratory distress.   Skin:     General: Skin is warm and dry.   Neurological:      Mental Status: She is alert and oriented to person, place, and time. Mental status is at baseline.   Psychiatric:         Mood and Affect: Mood normal.         Behavior: Behavior normal.         Thought Content: Thought content normal.         Judgment: Judgment normal.         Results  Results for orders placed or performed in visit on 09/15/22   CBC (No Diff)    Specimen: Blood   Result Value Ref Range    WBC 8.27 3.40 - 10.80 10*3/mm3    RBC 4.97 3.77 - 5.28 10*6/mm3    Hemoglobin 13.6 12.0 - 15.9 g/dL    Hematocrit 40.7 34.0 - 46.6 %    MCV 81.9 79.0 - 97.0 fL    MCH 27.4 26.6 - 33.0 pg    MCHC 33.4 31.5 - 35.7 g/dL    RDW 13.7 12.3 - 15.4 %    RDW-SD 40.4 37.0 - 54.0 fl    MPV 10.4 6.0 - 12.0 fL    Platelets 275 140 - 450 10*3/mm3   Comprehensive Metabolic Panel    Specimen: Blood   Result Value Ref Range    Glucose 115 (H) 65 - 99 mg/dL    BUN 7 6 - 20 mg/dL    Creatinine 0.73 0.57 - 1.00 mg/dL    Sodium 140 136 - 145 mmol/L    Potassium 4.3 3.5 - 5.2 mmol/L    Chloride 104 98 - 107 mmol/L    CO2 24.0 22.0 - 29.0 mmol/L    Calcium 9.4 8.6 - 10.5 mg/dL    Total Protein 7.2 6.0 - 8.5 g/dL    Albumin 4.50 3.50 - 5.20 g/dL    ALT (SGPT) 10 1 - 33 U/L    AST (SGOT) 14 1 - 32 U/L    Alkaline Phosphatase 64 39 - 117 U/L    Total Bilirubin 0.3 0.0 - 1.2 mg/dL    Globulin 2.7 gm/dL    A/G Ratio 1.7 g/dL    BUN/Creatinine Ratio 9.6 7.0 - 25.0    Anion Gap 12.0 5.0 - 15.0 mmol/L    eGFR 112.2 >60.0 mL/min/1.73   Lipid Panel    Specimen: Blood   Result Value Ref Range    Total Cholesterol 167 0 - 200 mg/dL     Triglycerides 141 0 - 150 mg/dL    HDL Cholesterol 36 (L) 40 - 60 mg/dL    LDL Cholesterol  106 (H) 0 - 100 mg/dL    VLDL Cholesterol 25 5 - 40 mg/dL    LDL/HDL Ratio 2.86    Hemoglobin A1c    Specimen: Blood   Result Value Ref Range    Hemoglobin A1C 5.20 4.80 - 5.60 %   Hepatitis C Antibody    Specimen: Blood   Result Value Ref Range    Hepatitis C Ab Non-Reactive Non-Reactive        Assessment and Plan  Diagnoses and all orders for this visit:    Anxiety  Moderate episode of recurrent major depressive disorder  - effexor is not effective  - will wean off over the course of 4 weeks  - start cymbalta 20mg daily when on effexor 37.5mg daily  - keep up with counseling  - follow up in 6 weeks    Elevated blood pressure reading  - will monitor, may be side effect of effexor or due to her weight gain    Class 3 severe obesity due to excess calories without serious comorbidity with body mass index (BMI) of 40.0 to 44.9 in adult  - had lost a significant amount of weight with walking but due to depression she is not active and has regained weight  - will choose a more weight neutral option for mood   - previously insurance did not cover saxenda in 2020, could consider attempting again if unable to lose weight with lifestyle        Health Maintenance  - Pap smear: 9/2020 negative, has GYN  - Mammogram: Start screening at age 40.  - Colonoscopy: Start screening at age 45.  - HCV: negative  - Immunizations: COVID booster today. Tdap 2/2020.  - Depression screening: PHQ9 =  19 4/2023    Return in about 6 weeks (around 6/6/2023) for Follow up mood.

## 2023-05-30 ENCOUNTER — PATIENT MESSAGE (OUTPATIENT)
Dept: INTERNAL MEDICINE | Facility: CLINIC | Age: 33
End: 2023-05-30

## 2023-05-30 NOTE — TELEPHONE ENCOUNTER
From: Arminda Llanos  To: Radha Rosales  Sent: 5/30/2023 11:28 AM EDT  Subject: it's me again    Hi Dr Rosales -    I'm sure you're sick of my random questions via message but I wanted to ask - my friend was prescribed phentermine/Adipex for weight loss and she's doing so well that I thought I'd inquire to see if that is something that could possibly be prescribed to me to help with weight loss.

## 2023-06-08 ENCOUNTER — OFFICE VISIT (OUTPATIENT)
Dept: INTERNAL MEDICINE | Facility: CLINIC | Age: 33
End: 2023-06-08
Payer: COMMERCIAL

## 2023-06-08 VITALS
OXYGEN SATURATION: 99 % | HEIGHT: 66 IN | BODY MASS INDEX: 40.5 KG/M2 | HEART RATE: 80 BPM | DIASTOLIC BLOOD PRESSURE: 66 MMHG | SYSTOLIC BLOOD PRESSURE: 122 MMHG | TEMPERATURE: 97.3 F | WEIGHT: 252 LBS

## 2023-06-08 DIAGNOSIS — E66.01 CLASS 3 SEVERE OBESITY DUE TO EXCESS CALORIES WITHOUT SERIOUS COMORBIDITY WITH BODY MASS INDEX (BMI) OF 40.0 TO 44.9 IN ADULT: ICD-10-CM

## 2023-06-08 DIAGNOSIS — F41.9 ANXIETY: Primary | ICD-10-CM

## 2023-06-08 DIAGNOSIS — F33.1 MODERATE EPISODE OF RECURRENT MAJOR DEPRESSIVE DISORDER: ICD-10-CM

## 2023-06-08 PROCEDURE — 99214 OFFICE O/P EST MOD 30 MIN: CPT | Performed by: INTERNAL MEDICINE

## 2023-06-08 RX ORDER — DULOXETIN HYDROCHLORIDE 20 MG/1
20 CAPSULE, DELAYED RELEASE ORAL DAILY
Qty: 90 CAPSULE | Refills: 1 | Status: SHIPPED | OUTPATIENT
Start: 2023-06-08

## 2023-06-08 RX ORDER — PHENTERMINE HYDROCHLORIDE 37.5 MG/1
37.5 CAPSULE ORAL EVERY MORNING
Qty: 30 CAPSULE | Refills: 0 | Status: SHIPPED | OUTPATIENT
Start: 2023-06-08

## 2023-06-08 NOTE — PROGRESS NOTES
"Internal Medicine Follow Up    Chief Complaint  Arminda Llanos is a 33 y.o. female who presents today for follow up of chronic medical conditions outlined below.    Chief Complaint   Patient presents with    Depression    Anxiety    Obesity        HPI  Mr. Llanos comes in today for follow up. She is doing much better on cymbalta. Off effexor for several weeks. Happy on current dose. No side effects noted but does note lower BP. She is interested in adipex for weight loss. GLP-1 cost prohibitive. She denies cardiac conditions, chest pain, palpitations. Has BP cuff at home and will monitor.       Review of Systems  Review of Systems   Constitutional: Negative.    Respiratory: Negative.     Cardiovascular: Negative.    Psychiatric/Behavioral: Negative.        Current Medications  Current Outpatient Medications on File Prior to Visit   Medication Sig Dispense Refill    hydrOXYzine pamoate (VISTARIL) 25 MG capsule TAKE ONE CAPSULE BY MOUTH THREE TIMES A DAY AS NEEDED FOR ANXIETY 30 capsule 2    VITAMIN D PO Take  by mouth.      VITAMIN E PO Take  by mouth.      [DISCONTINUED] DULoxetine (Cymbalta) 20 MG capsule Take 1 capsule by mouth Daily. 30 capsule 1    [DISCONTINUED] venlafaxine XR (Effexor XR) 37.5 MG 24 hr capsule Take 1 capsule by mouth Daily for 14 days. Start after completing 14 days on 75mg. 14 capsule 0    [DISCONTINUED] venlafaxine XR (Effexor XR) 75 MG 24 hr capsule Take 1 capsule by mouth Daily for 14 days. 14 capsule 0     No current facility-administered medications on file prior to visit.       Allergies  Allergies   Allergen Reactions    Wellbutrin [Bupropion] Irritability       Objective  Visit Vitals  /66   Pulse 80   Temp 97.3 °F (36.3 °C)   Ht 167.6 cm (66\")   Wt 114 kg (252 lb)   SpO2 99%   BMI 40.67 kg/m²        Physical Exam  Physical Exam  Vitals and nursing note reviewed.   Constitutional:       General: She is not in acute distress.     Appearance: She is well-developed. She is " obese. She is not ill-appearing or toxic-appearing.   HENT:      Head: Normocephalic and atraumatic.   Eyes:      Conjunctiva/sclera: Conjunctivae normal.   Cardiovascular:      Rate and Rhythm: Normal rate and regular rhythm.      Heart sounds: Normal heart sounds. No murmur heard.  Pulmonary:      Effort: Pulmonary effort is normal. No respiratory distress.      Breath sounds: Normal breath sounds.   Skin:     General: Skin is warm and dry.   Neurological:      Mental Status: She is alert and oriented to person, place, and time.   Psychiatric:         Mood and Affect: Mood normal.         Behavior: Behavior normal.         Thought Content: Thought content normal.         Judgment: Judgment normal.       Results  Results for orders placed or performed in visit on 09/15/22   CBC (No Diff)    Specimen: Blood   Result Value Ref Range    WBC 8.27 3.40 - 10.80 10*3/mm3    RBC 4.97 3.77 - 5.28 10*6/mm3    Hemoglobin 13.6 12.0 - 15.9 g/dL    Hematocrit 40.7 34.0 - 46.6 %    MCV 81.9 79.0 - 97.0 fL    MCH 27.4 26.6 - 33.0 pg    MCHC 33.4 31.5 - 35.7 g/dL    RDW 13.7 12.3 - 15.4 %    RDW-SD 40.4 37.0 - 54.0 fl    MPV 10.4 6.0 - 12.0 fL    Platelets 275 140 - 450 10*3/mm3   Comprehensive Metabolic Panel    Specimen: Blood   Result Value Ref Range    Glucose 115 (H) 65 - 99 mg/dL    BUN 7 6 - 20 mg/dL    Creatinine 0.73 0.57 - 1.00 mg/dL    Sodium 140 136 - 145 mmol/L    Potassium 4.3 3.5 - 5.2 mmol/L    Chloride 104 98 - 107 mmol/L    CO2 24.0 22.0 - 29.0 mmol/L    Calcium 9.4 8.6 - 10.5 mg/dL    Total Protein 7.2 6.0 - 8.5 g/dL    Albumin 4.50 3.50 - 5.20 g/dL    ALT (SGPT) 10 1 - 33 U/L    AST (SGOT) 14 1 - 32 U/L    Alkaline Phosphatase 64 39 - 117 U/L    Total Bilirubin 0.3 0.0 - 1.2 mg/dL    Globulin 2.7 gm/dL    A/G Ratio 1.7 g/dL    BUN/Creatinine Ratio 9.6 7.0 - 25.0    Anion Gap 12.0 5.0 - 15.0 mmol/L    eGFR 112.2 >60.0 mL/min/1.73   Lipid Panel    Specimen: Blood   Result Value Ref Range    Total Cholesterol 167 0  - 200 mg/dL    Triglycerides 141 0 - 150 mg/dL    HDL Cholesterol 36 (L) 40 - 60 mg/dL    LDL Cholesterol  106 (H) 0 - 100 mg/dL    VLDL Cholesterol 25 5 - 40 mg/dL    LDL/HDL Ratio 2.86    Hemoglobin A1c    Specimen: Blood   Result Value Ref Range    Hemoglobin A1C 5.20 4.80 - 5.60 %   Hepatitis C Antibody    Specimen: Blood   Result Value Ref Range    Hepatitis C Ab Non-Reactive Non-Reactive        Assessment and Plan  Diagnoses and all orders for this visit:    Anxiety  Moderate episode of recurrent major depressive disorder  - effexor lost efficacy, doing much better on cymbalta 20mg daily, will continue.  - continue counseling    Class 3 severe obesity due to excess calories without serious comorbidity with body mass index (BMI) of 40.0 to 44.9 in adult  - had been losing weight, now plateaued   - wegovy out of stock and both wegovy and saxenda costly  - no contrainidications to adipex. Will send in 30 day supply, 37.5mg daily. Discussed side effects and plan for short term use.  - after completion of adipex if needed could consider use of savings card for saxPreedo      Health Maintenance  - Pap smear: 9/2020 negative, has GYN  - Mammogram: Start screening at age 40.  - Colonoscopy: Start screening at age 45.  - HCV: negative  - Immunizations: COVID UTD. Tdap 2/2020.  - Depression screening: PHQ9 =  19 4/2023    Return for Next scheduled follow up.    Answers submitted by the patient for this visit:  Other (Submitted on 6/1/2023)  Please describe your symptoms.: a follow up for switching medication from venfalaxine to duloxetine. also wanted to ask about adipex/phentermine as a weight loss assistant to help me  Have you had these symptoms before?: Yes  How long have you been having these symptoms?: Greater than 2 weeks  Please list any medications you are currently taking for this condition.: duloxetine  Primary Reason for Visit (Submitted on 6/1/2023)  What is the primary reason for your visit?: Other

## 2023-07-23 DIAGNOSIS — E66.01 CLASS 3 SEVERE OBESITY DUE TO EXCESS CALORIES WITHOUT SERIOUS COMORBIDITY WITH BODY MASS INDEX (BMI) OF 40.0 TO 44.9 IN ADULT: ICD-10-CM

## 2023-07-24 RX ORDER — PHENTERMINE HYDROCHLORIDE 37.5 MG/1
37.5 CAPSULE ORAL EVERY MORNING
Qty: 30 CAPSULE | Refills: 0 | OUTPATIENT
Start: 2023-07-24

## 2023-09-18 ENCOUNTER — TELEMEDICINE (OUTPATIENT)
Dept: FAMILY MEDICINE CLINIC | Facility: TELEHEALTH | Age: 33
End: 2023-09-18
Payer: COMMERCIAL

## 2023-09-18 ENCOUNTER — E-VISIT (OUTPATIENT)
Dept: FAMILY MEDICINE CLINIC | Facility: TELEHEALTH | Age: 33
End: 2023-09-18
Payer: COMMERCIAL

## 2023-09-18 VITALS — HEIGHT: 66 IN | BODY MASS INDEX: 38.25 KG/M2 | WEIGHT: 238 LBS

## 2023-09-18 DIAGNOSIS — J02.0 STREP PHARYNGITIS: Primary | ICD-10-CM

## 2023-09-18 RX ORDER — AMOXICILLIN 875 MG/1
875 TABLET, COATED ORAL 2 TIMES DAILY
Qty: 20 TABLET | Refills: 0 | Status: SHIPPED | OUTPATIENT
Start: 2023-09-18 | End: 2023-09-28

## 2023-09-18 NOTE — E-VISIT ESCALATED
Patient escalated   Provider TIM Whitaker chose to escalate patient to another level of care because: Insufficient information to diagnose   Patient was sent the following message:   Based on the information you've provided us, you need to take another step to get care.   What to do now:   Setup a video visit   Please, schedule your video visit   Video visit     You won't be charged for your eVisit. If you paid with a credit card, the charge will be reversed.   Chief Complaint: Cold, flu, COVID, sinus, hay fever, or seasonal allergies   Patient introduction   Patient is 33-year-old female with cough, congestion, nasal discharge, itchy or watery eyes, itchy nose or sneezing, sore throat, voice hoarseness, headache, and fatigue that started 3 to 5 days ago.   COVID-19 testing history, vaccination status, and exposure:    Patient did a self-test within the last week. Patient specifies date of test as [id number]. Test result was negative.    Patient took a self-test during the interview. Test result was negative.    Received 3 doses of the COVID-19 vaccine (Moderna, Moderna, Moderna). Received their most recent dose of the vaccine more than 14 days ago.    No known exposure to a person with a confirmed or suspected case of COVID-19.    No travel outside local community within the last 14 days.   Risk factors for severe disease from COVID-19 infection    BMI >= 25.    Smoked tobacco in the past.    A mood disorder.   Warning. The following may warrant further investigation:    Dizziness that makes it hard to stand, walk, or do daily activities.   General presentation   Symptoms came on suddenly.   Fever:    No fever.   Sinus and nasal symptoms:    Nasal discharge.    Itchy nose or sneezing.    Yellow nasal drainage.    Nasal drainage is thick.    Postnasal drip.    Sinus pain or pressure on or around the forehead and eyes.    Patient first noticed sinus pain less than 5 days ago.    Sinus pain is worse with  Valsalva.    No history of unhealed nasal septal ulcer/nasal wound.    No history of antibiotic treatment for sinus infection in the last year.    No history of deviated septum or nasal polyps.   Throat symptoms:    Sore throat.    Previous tonsillectomy.    Has discomfort when swallowing but can swallow liquids and solid foods.    Voice is moderately hoarse. Patient does not believe hoarseness is due to voice strain.   Head and body aches:    Headache described as moderate (4 to 6 on a scale of 1 to 10).    Fatigue.    No sweats.    No chills.    No myalgia.   Cough:    Cough is not noticeably worse depending on time of day    Cough is productive of sputum.    Describes color of sputum as yellow.   Wheezing and shortness of breath:    No COPD diagnosis.    No asthma diagnosis.    No wheezing.    No shortness of breath.    No previous albuterol inhaler use for URIs, bronchitis, or pneumonia.    No previous steroid inhaler use for URIs, bronchitis, or pneumonia.   Chest pain:    No chest pain.   Ear symptoms:    Current symptoms include pressure, fullness, and a plugged or blocked sensation in the ear(s).   Dizziness:    Dizziness that interferes with daily activities.   Allergies:    Patient has known seasonal allergies.   Flu exposure:    No recent known exposure to a person with a confirmed flu diagnosis.    Has not had a flu vaccine this season.   Patient is taking over-the-counter medications for current symptoms, including acetaminophen and ibuprofen.   Review of red flags/alarm symptoms:    No changes in alertness or awareness.    No nuchal rigidity.    No symptoms suggesting airway obstruction.    No symptoms suggesting intracranial hemorrhage.    No decreased urination.   Pregnancy/menstrual status/breastfeeding:    Not pregnant.    Not breastfeeding.    Regarding date of last menstrual period, patient writes: September 12. 2023.   Self-exam:    Height: 167 centimeters    Weight: 107.9 kilograms    No  difficulty moving their chin toward their chest.    No palatal petechiae.    Swollen/painful neck lymph nodes.    Mild periorbital edema.   Recent antibiotic use:    Has not taken antibiotics for similar symptoms within the past month.   Current medications   Currently taking DULoxetine 20 MG capsule.   Medication allergies   None.   Medication contraindication review   Patient has history of depression and fungal infection. Therefore, the following medication(s) will not be prescribed:    Metoclopramide.    Prednisone.   No history of metoclopramide-associated dystonic reaction and tardive dyskinesia.   No known history of amoxicillin-clavulanate-associated cholestatic jaundice or hepatic impairment.   No known history of azithromycin-associated cholestatic jaundice or hepatic impairment.   Past medical history   Immune conditions: No immunocompromising conditions.   No history of cancer.   Social history   Does not smoke tobacco; smoked previously.   Patient-submitted comments   Patient was asked if they had anything to add about their symptoms. Patient writes: I have a hacking cough and tons of post-nasal drip. I am coughing up solid pieces of phlegm and it is yellow tinged with blood(?). My voice is very strained and it hurts to speak..   Patient requests a 2-day excuse note.   Assessment:   Patient determined to need a level of care not appropriate to be delivered through eVisit.   Plan:   Patient informed of need to seek in-person care   ----------   Electronically signed by TIM Whitaker on 2023-09-18 at 10:53AM   ----------   Patient Interview Transcript:   Which of these symptoms are bothering you? Select all that apply.    Cough    Stuffed-up nose or sinuses    Runny nose    Itchy or watery eyes    Itchy nose or sneezing    Sore throat    Hoarse voice or loss of voice    Headache    Fatigue or tiredness   Not selected:    Shortness of breath    Loss of smell or taste    Fever    Sweats    Chills     Muscle or body aches    Nausea or vomiting    Diarrhea    I don't have any of these symptoms   When did your current symptoms start? Select one.    3 to 5 days ago   Not selected:    Less than 48 hours ago    6 to 9 days ago    10 to 14 days ago    2 to 3 weeks ago    3 to 4 weeks ago    More than a month ago   Do you know the exact date your symptoms started? If so, enter the date as MM/DD/YY. Select one.    No   Not selected:    Yes (specify)   Did your symptoms come on suddenly or gradually? Select one.    Suddenly   Not selected:    Gradually    I'm not sure   Since your current symptoms started, have you had a viral test for COVID-19? - This includes home self-tests as well as nose swab or saliva tests done at a doctor's office, lab, or testing site. - It does NOT include antibody tests, which use a blood sample to test for past infection. Select one.    Yes   Not selected:    No   When was your most recent COVID-19 test? Select one.    Within the last week (specify date as MM/DD/YY): [id number]   Not selected:    Within the last 24 hours    7 to 14 days ago    15 to 30 days ago    More than 1 month ago   What type of COVID-19 test did you most recently have? Select one.    Viral self-test or home test   Not selected:    Viral test at a doctor's office, lab, or testing site   What was the result of your most recent COVID-19 test? Select one.    Negative   Not selected:    Positive   Even though your last test was negative, you could still have COVID. You may have tested before the virus was detectable. In some cases, repeat testing over several days is needed. If you have a COVID test kit, take the test now before continuing with this interview. Do you have a COVID test kit? Select one.    Yes, and I'll take another test now   Not selected:    Yes, but I prefer not to take a test now    No, I don't have a test kit   What is the result of the COVID-19 home test you just took? Select one.    Negative   Not  "selected:    Positive   Have you gotten the COVID-19 vaccine? Select one.    Yes   Not selected:    No   How many total doses of the COVID-19 vaccine have you gotten? This includes boosters as well as additional doses for those who are immunocompromised. Select one.    3 doses   Not selected:    1 dose    2 doses    4 doses    5 doses   1st dose    Moderna   Not selected:    J&J/Esequiel    Novavax    Pfizer   2nd dose    Moderna   Not selected:    J&J/Esequiel    Novavax    Pfizer   3rd dose    Moderna   Not selected:    J&J/Esequiel    Novavax    Pfizer   When did you get your most recent dose of the COVID-19 vaccine?    More than 14 days ago   Not selected:    Less than 48 hours (2 days) ago    48 to 72 hours (3 days) ago    3 to 5 days ago    5 to 7 days ago    7 to 14 days ago   In the last 14 days, have you traveled outside of your local community? This includes travel by car, RV, bus, train, or plane. Travel increases your chances of getting and spreading COVID-19. Select one.    No   Not selected:    Yes   In the last 14 days, have you had close contact with someone who has coronavirus (COVID-19)? \"Close contact\" means any of these: - Living in the same household as someone with COVID-19. - Caring for someone with COVID-19. - Being within 6 feet of someone with COVID-19 for a total of at least 15 minutes over a 24-hour period. For example, three 5-minute exposures for a total of 15 minutes. - Being in direct contact with respiratory droplets from someone with COVID-19 (being coughed on, kissing, sharing utensils). Select one.    No, not that I know of   Not selected:    Yes, a confirmed case    Yes, a suspected case   You mentioned having a headache. On a scale of 1 to 10, how severe is your headache pain? Select one.    Moderate (4 to 6)   Not selected:    Mild (1 to 3)    Severe (7 to 9)    Unbearable (10)    The worst headache of my life (10+)   Do you cough so hard that it's made you gag or vomit? By gag, " "we mean has your coughing made you choke or dry heave? Select all that apply.    Yes, my coughing has made me gag   Not selected:    Yes, my coughing has made me vomit    No   When is your cough the worst? Select all that apply.    I haven't noticed a difference depending on time of day   Not selected:    In the morning, or when I wake up    During the day    At nighttime, or while I'm sleeping   Are you coughing up mucus or phlegm? Select one.    Yes, a lot   Not selected:    No, my cough is dry    Yes, a little   What color is most of the mucus or phlegm that you're coughing up? Select one.    Yellow or yellowish   Not selected:    Clear    White/frothy    Green or greenish    Red or pink    I'm not sure   Do you feel sinus pain or pressure in any of these areas?    In my forehead    Around my eyes   Not selected:    Behind my nose    In my cheeks    In my upper teeth or jaw    No   When did you first notice your sinus pain or pressure? Select one.    Less than 5 days ago   Not selected:    5 to 9 days ago    10 to 14 days ago    2 to 4 weeks ago    1 month ago or longer   Does coughing, sneezing, or leaning forward make your sinuses feel worse? Select one.    Yes   Not selected:    No   What color is your nasal drainage? Select one.    Yellow or yellowish   Not selected:    Clear    White    Green or greenish    My nose is stuffed but not draining or running   Is your nasal drainage thick or thin? Select one.    Thick   Not selected:    Thin   Is there any drainage (mucus) going down the back of your throat? This kind of drainage is also called \"postnasal drip.\" Select one.    Yes   Not selected:    No, not that I know of   Can you swallow liquids and solid foods? A sore throat may be painful when swallowing, but it shouldn't prevent you from swallowing. Select one.    Yes, but it's uncomfortable   Not selected:    Yes, with ease    Yes, but it's painful    It's hard to swallow anything because it feels like " liquids and food get stuck in my throat    No, I can't swallow anything, liquid or solid foods   Is your throat pain worse on one side than the other? Select one.    No, it's the same on both sides   Not selected:    Yes, it's worse on the right side    Yes, it's worse on the left side   Since your symptoms started, have you felt dizzy? Select one.    Yes, and it makes it hard to stand, walk, or do daily activities   Not selected:    Yes, but I can continue with my regular daily activities    No   Do you have chest pain? You might also feel it as discomfort, aching, tightness, or squeezing in the chest. Select one.    No   Not selected:    Yes   Have you urinated at least 3 times in the last 24 hours? Select one.    Yes   Not selected:    No   Changes in alertness or awareness may mean you need emergency care. Since your symptoms started, have you had any of these? Select all that apply.    None of the above   Not selected:    Confusion    Slurred speech    Not knowing where you are or what day it is    Difficulty staying conscious    Fainting or passing out   Do your symptoms include a whistling sound, or wheezing, when you breathe? Select one.    No   Not selected:    Yes    I'm not sure   Early in this interview, you told us you were hoarse or you'd lost your voice. How would you describe the changes to your voice? Select one.    It's harder than usual to talk   Not selected:    It just sounds a little raspy    I can barely talk at all   Is it possible that you strained your voice? Singing, yelling, or talking more or louder than usual can cause voice strain. Select one.    No, not that I know of   Not selected:    Yes   Are your eyelids or the areas around your eyes puffy? Select one.    Yes, but I can easily open my eye(s)   Not selected:    Yes, and it's hard to open my eye(s)    Yes, and my eye(s) are completely swollen shut    No   Do you have any of these symptoms in your ear(s)? Select all that apply.     Pressure    Fullness    Plugged or blocked sensation   Not selected:    Pain    Crackling or popping    None of the above   Can you move your chin toward your chest?    Yes   Not selected:    No, my neck is too stiff   Are your tonsils larger than usual?    I've had my tonsils removed   Not selected:    Yes    No, not that I can tell   Are there red spots on the roof of your mouth or the back of your throat?    No, not that I can see   Not selected:    Yes   Are your glands/lymph nodes swollen, or does it hurt when you touch them?    Yes   Not selected:    No, not that I can tell   In the past week, has anyone around you (such as at school, work, or home) had a confirmed diagnosis of the flu? A confirmed diagnosis means that a nose swab was done to verify a flu infection. Select all that apply.    No, not that I know of   Not selected:    I live with someone who has the flu    I've been within touching distance of someone who has the flu    I've walked by, or sat about 3 feet away from, someone who has the flu    I've been in the same building as someone who has the flu   Have you ever been diagnosed with asthma? Select one.    No   Not selected:    Yes   Have you ever been prescribed albuterol to use for wheezing, cough, or shortness of breath caused by a cold, bronchitis, or pneumonia? Albuterol (ProAir, Proventil, Ventolin) is prescribed as an inhaler or a solution to be used with a nebulizer machine. Select one.    No, not that I know of   Not selected:    Yes   Have you ever been prescribed a steroid inhaler to use for wheezing, cough, or shortness of breath caused by a cold, bronchitis, or pneumonia? Some examples of steroid inhalers include Pulmicort, Flovent, Qvar, and Alvesco. Select one.    No, not that I know of   Not selected:    Yes   Have you ever been diagnosed with chronic obstructive pulmonary disease (COPD)? Select one.    No, not that I know of   Not selected:    Yes   In the past month, have you  taken antibiotics for similar symptoms? Examples of antibiotics include amoxicillin, amoxicillin-clavulanate (Augmentin), penicillin, cefdinir (Omnicef), doxycycline, and clindamycin (Cleocin). Select one.    No   Not selected:    Yes (specify)   In the last year, how many times were you treated with antibiotics for a sinus infection? Select one.    None   Not selected:    1 to 3 times    4 or more times   Have you been diagnosed with a deviated septum or nasal polyps? The nose is divided into two nostrils by the septum. A crooked septum is called a deviated septum. Nasal polyps are growths inside the nose or sinuses. Select one.    No, not that I know of   Not selected:    Yes, but I had surgery to treat them    Yes, I have a deviated septum    Yes, I have nasal polyps    Yes, I have a deviated septum and nasal polyps   Do you have a sore inside your nose that won't heal? Select one.    No, not that I know of   Not selected:    Yes   Do you have allergies (pollen, dust mites, mold, animal dander)? Select one.    Yes   Not selected:    No, not that I know of   What kind of allergies do you have? Select all that apply.    Seasonal allergies (hay fever)   Not selected:    Pet allergies    Dust allergies    None of the above    I'm not sure   Do you think your symptoms could be allergy-related? Select one.    I'm not sure   Not selected:    Yes    No   Have you had a flu shot this season? Select one.    No   Not selected:    Yes, less than 2 weeks ago    Yes, 2 to 4 weeks ago    Yes, 1 to 3 months ago    Yes, 3 to 6 months ago    Yes, more than 6 months ago   Are you pregnant? Select one.    No   Not selected:    Yes   When was your last menstrual period? If you don't currently have periods or no longer have periods, please briefly explain.       Within the last 2 weeks, have you: - Given birth - Had a miscarriage - Had a pregnancy loss - Had an  Being postpartum (live birth or loss) within  the last 2 weeks increases your risk of flu complications. Select one.    No   Not selected:    Yes   Are you breastfeeding? Select one.    No   Not selected:    Yes   The flu and COVID-19 can be more serious for people in certain groups. The next few questions help us figure out if you or anyone you live with is at higher risk for complications from these infections. Do any of these statements apply to you? Select all that apply.    None of the above   Not selected:    I'm     I'm     I'm Black    I'm  or    Do you smoke tobacco? Select one.    No, I quit   Not selected:    Yes, every day    Yes, some days    No   Do you have any of these conditions? Select all that apply.    Mood disorder, including depression or schizophrenia spectrum disorders   Not selected:    Chronic lung disease, such as cystic fibrosis or interstitial fibrosis    Heart disease, such as congenital heart disease, congestive heart failure, or coronary artery disease    Disorder of the brain, spinal cord, or nerves and muscles, such as dementia, cerebral palsy, epilepsy, muscular dystrophy, or developmental delay    Metabolic disorder or mitochondrial disease    Cerebrovascular disease, such as stroke or another condition affecting the blood vessels or blood supply to the brain    Down syndrome    Substance use disorder, such as alcohol, opioid, or cocaine use disorder    Tuberculosis    None of the above   Do you live in a group care setting? Examples include: - Nursing home - Residential care - Psychiatric treatment facility - [facility] - [facility] - Board and care home - Homeless shelter - Foster care setting Select one.    No   Not selected:    Yes   Are you a healthcare worker? Select one.    No   Not selected:    Yes   People with a very high body mass index (BMI) are at higher risk for developing complications from the flu and severe illness from COVID-19. To determine your BMI, we need to know  your weight and height. Please enter your weight (in pounds).    Weight   Please enter your height.    Height   Do you have any of these conditions that can affect the immune system? Scroll to see all options. Select all that apply.    None of these   Not selected:    History of bone marrow transplant    Chronic kidney disease    Chronic liver disease (including cirrhosis)    HIV/AIDS    Inflammatory bowel disease (Crohn's disease or ulcerative colitis)    Lupus    Moderate to severe plaque psoriasis    Multiple sclerosis    Rheumatoid arthritis    Sickle cell anemia    Alpha or beta thalassemia    History of solid organ transplant (kidney, liver, or heart)    History of spleen removal    An autoimmune disorder not listed here (specify)    A condition requiring treatment with long-term use of oral steroids (such as prednisone, prednisolone, or dexamethasone) (specify)   Have you ever been diagnosed with cancer? Select one.    No   Not selected:    Yes, I have cancer now    Yes, but I'm in remission   Do any of these apply to you? Select all that apply.    None of the above   Not selected:    I've been hospitalized within the last 5 days    I have diabetes    I'm in close contact with a child in    The flu and COVID-19 can be more serious for people in certain groups. Do any of these apply to the people who live with you? Select all that apply.    None of the above   Not selected:    Under age 5    Over age 65            Black     or     Pregnant    Has given birth, had a miscarriage, had a pregnancy loss, or had an  in the last 2 weeks   Does any member of your household have any of these medical conditions? Select all that apply.    None of the above   Not selected:    Asthma    Disorders of the brain, spinal cord, or nerves and muscles, such as dementia, cerebral palsy, epilepsy, muscular dystrophy, or developmental delay    Chronic lung disease, such as  COPD or cystic fibrosis    Heart disease, such as congenital heart disease, congestive heart failure, or coronary artery disease    Cerebrovascular disease, such as stroke or another condition affecting the blood vessels or blood supply to the brain    Blood disorders, such as sickle cell disease    Diabetes    Metabolic disorders such as inherited metabolic disorders or mitochondrial disease    Kidney disorders    Liver disorders    Weakened immune system due to illness or medications such as chemotherapy or steroids    Children under the age of 19 who are on long-term aspirin therapy    Extreme obesity (BMI > 40)   Do you have any of these conditions? Scroll to see all options. Select all that apply.    Depression    Fungal infection   Not selected:    Aspirin triad (also known as Samter's triad or ASA triad)    Asthma or hives from taking aspirin or other NSAIDs, such as ibuprofen or naproxen    Blockage or narrowing of the blood vessels of the heart    Blood clotting disorder    Blood dyscrasia, such anemia, leukemia, lymphoma, or myeloma    Bone marrow depression    Catecholamine-releasing paraganglioma    Congenital long QT syndrome    Difficulty urinating or completely emptying your bladder    Uncorrected electrolyte abnormalities    Gastrointestinal (GI) bleeding    Gastrointestinal (GI) obstruction    G6PD deficiency    Recent heart attack    High blood pressure    Irregular heartbeat or heart rhythm    Mononucleosis (mono)    Myasthenia gravis    Parkinson's disease    Pheochromocytoma    Reye syndrome    Seizure disorder    Thyroid disease    Ulcerative colitis    None of the above   Have you ever had either of these conditions? Select all that apply.    No   Not selected:    Metoclopramide-associated dystonic reaction    Tardive dyskinesia   Just a few more questions about medications, and then you're finished. Have you used any non-prescription medications or nasal sprays for your current symptoms?  Examples include saline sprays, decongestants, NyQuil, and Tylenol. Select one.    Yes   Not selected:    No   Which of these non-prescription medications have you tried? Scroll to see all options. Select all that apply.    Acetaminophen (Tylenol)    Ibuprofen (Advil, Motrin, Midol)   Not selected:    Budesonide (Rhinocort)    Cetirizine (Zyrtec)    Chlorpheniramine (Aller-chlor, Chlor-Trimeton)    Cromolyn (NasalCrom)    Dextromethorphan (Delsym, Robitussin, Vicks DayQuil Cough)    Diphenhydramine (Benadryl)    Fexofenadine (Allegra)    Fluticasone (Flonase)    Guaifenesin (Mucinex)    Guaifenesin/dextromethorphan (Delsym DM, Mucinex DM, Robitussin DM)    Ketotifen (Alaway, Zaditor)    Loratadine (Alavert, Claritin)    Naphazoline-pheniramine (Naphcon-A, Opcon-A, Visine-A)    Omeprazole (Prilosec)    Oxymetazoline (Afrin)    Phenylephrine (Sudafed PE)    Triamcinolone (Nasacort)    None of the above   Have you taken any monoamine oxidase inhibitor (MAOI) medications in the last 14 days? Examples include rasagiline (Azilect), selegiline (Eldepryl, Zelapar), isocarboxazid (Marplan), phenelzine (Nardil), and tranylcypromine (Parnate). Select one.    No, not that I know of   Not selected:    Yes   Do you take Kynmobi or Apokyn (apomorphine)? Select one.    No   Not selected:    Yes   Are you still taking these medications listed in your medical record? If you're not taking any of these, click Next. Select all that apply.    DULoxetine 20 MG capsule   Are you taking any other medications, vitamins, or supplements? Select one.    No   Not selected:    Yes   Have you ever had an allergic or bad reaction to any medication? Select one.    No   Not selected:    Yes   Are you allergic to milk or to the proteins found in milk (for example, whey or casein)? A milk allergy is different from lactose intolerance. Select one.    No, not that I know of   Not selected:    Yes   Have you ever had jaundice or liver problems as a result  of taking amoxicillin-clavulanate (Augmentin)? Jaundice is a condition in which the skin and the whites of the eyes turn yellow. Select all that apply.    No, not that I know of   Not selected:    Yes, jaundice    Yes, liver problems   Have you ever had jaundice or liver problems as a result of taking azithromycin (Zithromax, Zmax)? Jaundice is a condition in which the skin and the whites of the eyes turn yellow. Select all that apply.    No, not that I know of   Not selected:    Yes, jaundice    Yes, liver problems   Do you need a doctor's note? A doctor's note confirms that you received care today and states when you can return to school or work. It does not contain information about your diagnosis or treatment plan. Your provider will make the final decision on whether to give you a doctor's note and for how long. Doctor's notes CANNOT be backdated. We can't provide medical leave paperwork through this type of visit. If more paperwork is needed to request time off, contact your primary care provider. Select one.    Today and tomorrow (2 days)   Not selected:    Today only (1 day)    3 days    5 days    7 days    10 days    14 days    No   Is there anything you'd like to add about your symptoms? Please limit your comments to the symptoms asked about in this interview. If you include comments about other concerns, your provider may recommend that you be seen in person.    I have a hacking cough and tons of post-nasal drip. I am coughing up solid pieces of phlegm and it is yellow tinged with blood(?). My voice is very strained and it hurts to speak.   ----------   Medical history   Medical history data does not currently exist for this patient.

## 2023-09-18 NOTE — PROGRESS NOTES
You have chosen to receive care through a telehealth visit.  Do you consent to use a video/audio connection for your medical care today? Yes     HPI  Arminda Llanos is a 33 y.o. female  presents with complaint of  sore throat, drainage and headache. Additional symptoms that she is having are noted in the ROS portion of this visit. She has had her symptoms for 4 days. She does report a history of strep throat. She does report strep exposure. She did do a COVID test and the result of that test was negative.    Review of Systems   Constitutional:  Positive for fatigue. Negative for chills and fever.   HENT:  Positive for congestion, postnasal drip, sinus pressure, sinus pain, sneezing and sore throat. Negative for rhinorrhea.    Respiratory:  Positive for cough. Negative for chest tightness, shortness of breath and wheezing.    Gastrointestinal:  Negative for diarrhea and nausea.   Musculoskeletal:  Positive for myalgias (mild).   Neurological:  Positive for headaches.     Past Medical History:   Diagnosis Date    Anxiety 2011    COVID-19 08/01/2022    Decreased body weight 02/13/2020    Depression     Headache     Obesity 2013    Urinary tract infection        Family History   Problem Relation Age of Onset    Breast cancer Mother 50        s/p double mastectomy    Depression Mother     Anxiety disorder Mother     Cancer Mother         breast cancer    Diabetes Father     Diabetes Maternal Grandmother     Diabetes Maternal Grandfather     Brain cancer Maternal Grandfather     Cancer Maternal Grandfather         brain cancer    Diabetes Paternal Grandmother     Diabetes Paternal Grandfather     Depression Sister     Anxiety disorder Sister     Liver disease Half-Brother         fatty liver    Colon cancer Neg Hx     Osteoporosis Neg Hx        Social History     Socioeconomic History    Marital status:    Tobacco Use    Smoking status: Former     Packs/day: 0.50     Years: 7.00     Pack years: 3.50      "Types: Cigarettes     Quit date: 2016     Years since quittin.1    Smokeless tobacco: Never    Tobacco comments:     2016 quit   Vaping Use    Vaping Use: Never used   Substance and Sexual Activity    Alcohol use: Never    Drug use: Never    Sexual activity: Yes     Partners: Male     Birth control/protection: Coitus interruptus       Arminda Llanos  reports that she quit smoking about 7 years ago. Her smoking use included cigarettes. She has a 3.50 pack-year smoking history. She has never used smokeless tobacco..      Ht 167.6 cm (66\")   Wt 108 kg (238 lb)   Breastfeeding No   BMI 38.41 kg/m²     PHYSICAL EXAM  Physical Exam   Constitutional: She is oriented to person, place, and time. She appears well-developed.   HENT:   Head: Normocephalic and atraumatic.   Nose: Nose normal.   Mouth/Throat: Mucous membranes are erythematous.   Tonsils absent   Eyes: Lids are normal. Right eye exhibits no discharge. Left eye exhibits no discharge. Right conjunctiva is not injected. Left conjunctiva is not injected.   Pulmonary/Chest:  No respiratory distress.  Neurological: She is alert and oriented to person, place, and time. No cranial nerve deficit.   Psychiatric: She has a normal mood and affect. Her speech is normal and behavior is normal. Judgment and thought content normal.     Results for orders placed or performed in visit on 09/15/22   CBC (No Diff)    Specimen: Blood   Result Value Ref Range    WBC 8.27 3.40 - 10.80 10*3/mm3    RBC 4.97 3.77 - 5.28 10*6/mm3    Hemoglobin 13.6 12.0 - 15.9 g/dL    Hematocrit 40.7 34.0 - 46.6 %    MCV 81.9 79.0 - 97.0 fL    MCH 27.4 26.6 - 33.0 pg    MCHC 33.4 31.5 - 35.7 g/dL    RDW 13.7 12.3 - 15.4 %    RDW-SD 40.4 37.0 - 54.0 fl    MPV 10.4 6.0 - 12.0 fL    Platelets 275 140 - 450 10*3/mm3   Comprehensive Metabolic Panel    Specimen: Blood   Result Value Ref Range    Glucose 115 (H) 65 - 99 mg/dL    BUN 7 6 - 20 mg/dL    Creatinine 0.73 0.57 - 1.00 mg/dL    Sodium 140 " 136 - 145 mmol/L    Potassium 4.3 3.5 - 5.2 mmol/L    Chloride 104 98 - 107 mmol/L    CO2 24.0 22.0 - 29.0 mmol/L    Calcium 9.4 8.6 - 10.5 mg/dL    Total Protein 7.2 6.0 - 8.5 g/dL    Albumin 4.50 3.50 - 5.20 g/dL    ALT (SGPT) 10 1 - 33 U/L    AST (SGOT) 14 1 - 32 U/L    Alkaline Phosphatase 64 39 - 117 U/L    Total Bilirubin 0.3 0.0 - 1.2 mg/dL    Globulin 2.7 gm/dL    A/G Ratio 1.7 g/dL    BUN/Creatinine Ratio 9.6 7.0 - 25.0    Anion Gap 12.0 5.0 - 15.0 mmol/L    eGFR 112.2 >60.0 mL/min/1.73   Lipid Panel    Specimen: Blood   Result Value Ref Range    Total Cholesterol 167 0 - 200 mg/dL    Triglycerides 141 0 - 150 mg/dL    HDL Cholesterol 36 (L) 40 - 60 mg/dL    LDL Cholesterol  106 (H) 0 - 100 mg/dL    VLDL Cholesterol 25 5 - 40 mg/dL    LDL/HDL Ratio 2.86    Hemoglobin A1c    Specimen: Blood   Result Value Ref Range    Hemoglobin A1C 5.20 4.80 - 5.60 %   Hepatitis C Antibody    Specimen: Blood   Result Value Ref Range    Hepatitis C Ab Non-Reactive Non-Reactive       Diagnoses and all orders for this visit:    1. Strep pharyngitis (Primary)    Other orders  -     amoxicillin (AMOXIL) 875 MG tablet; Take 1 tablet by mouth 2 (Two) Times a Day for 10 days.  Dispense: 20 tablet; Refill: 0    Amoxicillin as directed  Alternate tylenol and ibuprofen for pain or fever  Hydrate well  May gargle with warm salt water  May try hot tea with lemon and honey    FOLLOW-UP  If symptoms worsen or persist follow up with PCP, Virtual Care or Urgent Care    Patient verbalizes understanding of medication dosage, comfort measures, instructions for treatment and follow-up.    Fely Valdez, TIM  09/18/2023  11:25 EDT    The use of a video visit has been reviewed with the patient and verbal informed consent has been obtained. Myself and Arminda Llanos participated in this visit. The patient is located in [address] [place] [address].    I am located in [place], [place]. [employer] were utilized. I spent  25 minutes in the patient's chart for this visit.

## 2023-09-20 ENCOUNTER — LAB (OUTPATIENT)
Dept: LAB | Facility: HOSPITAL | Age: 33
End: 2023-09-20
Payer: COMMERCIAL

## 2023-09-20 ENCOUNTER — OFFICE VISIT (OUTPATIENT)
Dept: INTERNAL MEDICINE | Facility: CLINIC | Age: 33
End: 2023-09-20
Payer: COMMERCIAL

## 2023-09-20 VITALS
BODY MASS INDEX: 38.73 KG/M2 | HEIGHT: 66 IN | TEMPERATURE: 98.1 F | WEIGHT: 241 LBS | OXYGEN SATURATION: 99 % | SYSTOLIC BLOOD PRESSURE: 126 MMHG | DIASTOLIC BLOOD PRESSURE: 80 MMHG | HEART RATE: 84 BPM

## 2023-09-20 DIAGNOSIS — Z00.00 ANNUAL PHYSICAL EXAM: ICD-10-CM

## 2023-09-20 DIAGNOSIS — J02.0 STREP PHARYNGITIS: ICD-10-CM

## 2023-09-20 DIAGNOSIS — E66.09 CLASS 2 OBESITY DUE TO EXCESS CALORIES WITHOUT SERIOUS COMORBIDITY WITH BODY MASS INDEX (BMI) OF 38.0 TO 38.9 IN ADULT: ICD-10-CM

## 2023-09-20 DIAGNOSIS — L73.2 HIDRADENITIS SUPPURATIVA: ICD-10-CM

## 2023-09-20 DIAGNOSIS — F33.1 MODERATE EPISODE OF RECURRENT MAJOR DEPRESSIVE DISORDER: ICD-10-CM

## 2023-09-20 DIAGNOSIS — Z00.00 ANNUAL PHYSICAL EXAM: Primary | ICD-10-CM

## 2023-09-20 DIAGNOSIS — F41.9 ANXIETY: ICD-10-CM

## 2023-09-20 PROCEDURE — 85027 COMPLETE CBC AUTOMATED: CPT

## 2023-09-20 PROCEDURE — 80053 COMPREHEN METABOLIC PANEL: CPT

## 2023-09-20 PROCEDURE — 80061 LIPID PANEL: CPT

## 2023-09-20 PROCEDURE — 83036 HEMOGLOBIN GLYCOSYLATED A1C: CPT

## 2023-09-20 RX ORDER — CLOTRIMAZOLE AND BETAMETHASONE DIPROPIONATE 10; .64 MG/G; MG/G
CREAM TOPICAL
COMMUNITY
Start: 2023-09-19

## 2023-09-20 RX ORDER — METHYLPREDNISOLONE 4 MG/1
TABLET ORAL
COMMUNITY
Start: 2023-09-19

## 2023-09-20 RX ORDER — DULOXETIN HYDROCHLORIDE 20 MG/1
20 CAPSULE, DELAYED RELEASE ORAL DAILY
Qty: 90 CAPSULE | Refills: 1 | Status: SHIPPED | OUTPATIENT
Start: 2023-09-20

## 2023-09-20 NOTE — PROGRESS NOTES
Internal Medicine Annual Exam  Arminda Llanos is a 33 y.o. female who presents today for an annual exam and with concerns as outlined below.    Chief Complaint  Chief Complaint   Patient presents with    Annual Exam    Headache        HPI  Ms. Alexis comes in today for physical. She had sore throat and went to  yesterday, diagnosed with strep and on amoxicillin and medrol dosepak. Improving. She is UTD with dental and vision exams. She is due for GYN appointment, however provider left practice. She needs to call for new appointment. She is losing weight and plans to start semaglutide next week through weight loss clinic. She notes that mood has been stable on cymbalta. She is not smoking, vaping, drinking alcohol, or using illicit substances. Flu shot deferred today due to illness.       Review of Systems  Review of Systems   Constitutional: Negative.    HENT:  Positive for sore throat.    Eyes: Negative.    Respiratory: Negative.     Cardiovascular: Negative.    Gastrointestinal: Negative.    Genitourinary: Negative.    Musculoskeletal: Negative.    Skin:  Positive for rash.   Allergic/Immunologic: Positive for environmental allergies.   Neurological: Negative.    Psychiatric/Behavioral: Negative.        Past Medical History  Past Medical History:   Diagnosis Date    Anxiety 2011    COVID-19 08/01/2022    Decreased body weight 02/13/2020    Depression     Headache     Obesity 2013    Urinary tract infection         Surgical History  Past Surgical History:   Procedure Laterality Date    TONSILLECTOMY Bilateral 07/2011    WISDOM TOOTH EXTRACTION Bilateral 2010        Family History  Family History   Problem Relation Age of Onset    Breast cancer Mother 50        s/p double mastectomy    Depression Mother     Anxiety disorder Mother     Cancer Mother         breast cancer    Diabetes Father     Diabetes Maternal Grandmother     Diabetes Maternal Grandfather     Brain cancer Maternal Grandfather     Cancer  "Maternal Grandfather         brain cancer    Diabetes Paternal Grandmother     Diabetes Paternal Grandfather     Depression Sister     Anxiety disorder Sister     Liver disease Half-Brother         fatty liver    Colon cancer Neg Hx     Osteoporosis Neg Hx         Social History  Social History     Socioeconomic History    Marital status:    Tobacco Use    Smoking status: Former     Packs/day: 0.50     Years: 7.00     Pack years: 3.50     Types: Cigarettes     Quit date: 2016     Years since quittin.1    Smokeless tobacco: Never    Tobacco comments:      quit   Vaping Use    Vaping Use: Never used   Substance and Sexual Activity    Alcohol use: Never    Drug use: Never    Sexual activity: Yes     Partners: Male     Birth control/protection: Coitus interruptus        Current Medications  Current Outpatient Medications on File Prior to Visit   Medication Sig Dispense Refill    amoxicillin (AMOXIL) 875 MG tablet Take 1 tablet by mouth 2 (Two) Times a Day for 10 days. 20 tablet 0    clotrimazole-betamethasone (LOTRISONE) 1-0.05 % cream       DULoxetine (Cymbalta) 20 MG capsule Take 1 capsule by mouth Daily. 90 capsule 1    hydrOXYzine pamoate (VISTARIL) 25 MG capsule TAKE ONE CAPSULE BY MOUTH THREE TIMES A DAY AS NEEDED FOR ANXIETY 30 capsule 2    methylPREDNISolone (MEDROL) 4 MG dose pack       SEMAGLUTIDE,0.25 OR 0.5MG/DOS, SC       VITAMIN D PO Take  by mouth.      VITAMIN E PO Take  by mouth.       No current facility-administered medications on file prior to visit.       Allergies  Allergies   Allergen Reactions    Wellbutrin [Bupropion] Irritability        Objective  Visit Vitals  /80   Pulse 84   Temp 98.1 °F (36.7 °C)   Ht 167.6 cm (66\")   Wt 109 kg (241 lb)   SpO2 99%   BMI 38.90 kg/m²        Physical Exam  Physical Exam  Vitals and nursing note reviewed.   Constitutional:       General: She is not in acute distress.     Appearance: She is well-developed. She is obese. She is not " ill-appearing, toxic-appearing or diaphoretic.   HENT:      Head: Normocephalic and atraumatic.      Right Ear: Tympanic membrane, ear canal and external ear normal.      Left Ear: Tympanic membrane, ear canal and external ear normal.      Nose: Nose normal.      Mouth/Throat:      Mouth: Mucous membranes are moist.      Pharynx: Oropharynx is clear. No oropharyngeal exudate.   Eyes:      General: No scleral icterus.     Conjunctiva/sclera: Conjunctivae normal.   Cardiovascular:      Rate and Rhythm: Normal rate and regular rhythm.      Heart sounds: Normal heart sounds. No murmur heard.  Pulmonary:      Effort: Pulmonary effort is normal. No respiratory distress.      Breath sounds: Normal breath sounds.   Abdominal:      General: There is no distension.      Palpations: Abdomen is soft. There is no mass.      Tenderness: There is no abdominal tenderness.   Musculoskeletal:         General: No deformity.      Cervical back: Neck supple.      Right lower leg: No edema.      Left lower leg: No edema.   Lymphadenopathy:      Cervical: No cervical adenopathy.   Skin:     General: Skin is warm and dry.   Neurological:      Mental Status: She is alert and oriented to person, place, and time. Mental status is at baseline.      Gait: Gait normal.   Psychiatric:         Mood and Affect: Mood normal.         Behavior: Behavior normal.         Thought Content: Thought content normal.         Judgment: Judgment normal.        Results  Results for orders placed or performed in visit on 09/15/22   CBC (No Diff)    Specimen: Blood   Result Value Ref Range    WBC 8.27 3.40 - 10.80 10*3/mm3    RBC 4.97 3.77 - 5.28 10*6/mm3    Hemoglobin 13.6 12.0 - 15.9 g/dL    Hematocrit 40.7 34.0 - 46.6 %    MCV 81.9 79.0 - 97.0 fL    MCH 27.4 26.6 - 33.0 pg    MCHC 33.4 31.5 - 35.7 g/dL    RDW 13.7 12.3 - 15.4 %    RDW-SD 40.4 37.0 - 54.0 fl    MPV 10.4 6.0 - 12.0 fL    Platelets 275 140 - 450 10*3/mm3   Comprehensive Metabolic Panel     Specimen: Blood   Result Value Ref Range    Glucose 115 (H) 65 - 99 mg/dL    BUN 7 6 - 20 mg/dL    Creatinine 0.73 0.57 - 1.00 mg/dL    Sodium 140 136 - 145 mmol/L    Potassium 4.3 3.5 - 5.2 mmol/L    Chloride 104 98 - 107 mmol/L    CO2 24.0 22.0 - 29.0 mmol/L    Calcium 9.4 8.6 - 10.5 mg/dL    Total Protein 7.2 6.0 - 8.5 g/dL    Albumin 4.50 3.50 - 5.20 g/dL    ALT (SGPT) 10 1 - 33 U/L    AST (SGOT) 14 1 - 32 U/L    Alkaline Phosphatase 64 39 - 117 U/L    Total Bilirubin 0.3 0.0 - 1.2 mg/dL    Globulin 2.7 gm/dL    A/G Ratio 1.7 g/dL    BUN/Creatinine Ratio 9.6 7.0 - 25.0    Anion Gap 12.0 5.0 - 15.0 mmol/L    eGFR 112.2 >60.0 mL/min/1.73   Lipid Panel    Specimen: Blood   Result Value Ref Range    Total Cholesterol 167 0 - 200 mg/dL    Triglycerides 141 0 - 150 mg/dL    HDL Cholesterol 36 (L) 40 - 60 mg/dL    LDL Cholesterol  106 (H) 0 - 100 mg/dL    VLDL Cholesterol 25 5 - 40 mg/dL    LDL/HDL Ratio 2.86    Hemoglobin A1c    Specimen: Blood   Result Value Ref Range    Hemoglobin A1C 5.20 4.80 - 5.60 %   Hepatitis C Antibody    Specimen: Blood   Result Value Ref Range    Hepatitis C Ab Non-Reactive Non-Reactive        Assessment and Plan  Diagnoses and all orders for this visit:    Annual physical exam  - Counseling was given to patient for the following topics:  healthy eating habits and importance of immunizations, including risks and benefits. Also discussed the importance of regular dental and vision care.    Anxiety  Moderate episode of recurrent major depressive disorder  - effexor lost efficacy, doing much better on cymbalta 20mg daily, will continue.  - continue counseling    Class 2 severe obesity due to excess calories without serious comorbidity with body mass index (BMI) of 38.0 to 38.9 in adult  - s/p 30 days of adipex  - weight down to 241lbs, continue lifestyle modification    Hidradenitis suppurativa  - currently asymptomatic, insurance did not cover clindamycin topical     Strep pharyngitis   -  tested positive yesterday  - doing better on amoxicillin and medrol dosepak    Health Maintenance   Topic Date Due    PAP SMEAR  09/02/2023    ANNUAL PHYSICAL  09/14/2023    BMI FOLLOWUP  09/14/2023    INFLUENZA VACCINE  10/01/2023    TDAP/TD VACCINES (2 - Td or Tdap) 02/13/2030    HEPATITIS C SCREENING  Completed    COVID-19 Vaccine  Completed    Pneumococcal Vaccine 0-64  Aged Out      Health Maintenance  - Pap smear: 9/2020 negative, GYN left practice so she will call to schedule with new provider.  - Mammogram: Start screening at age 40.  - Colonoscopy: Start screening at age 45.  - HCV: negative  - Immunizations: COVID booster discussed. Flu deferred. Tdap 2/2020.  - Depression screening: PHQ9 =  19 4/2023    Return in about 6 months (around 3/20/2024) for Follow up mood, 1 year for annual, Labs today.

## 2023-09-21 LAB
ALBUMIN SERPL-MCNC: 4.6 G/DL (ref 3.5–5.2)
ALBUMIN/GLOB SERPL: 1.4 G/DL
ALP SERPL-CCNC: 77 U/L (ref 39–117)
ALT SERPL W P-5'-P-CCNC: 10 U/L (ref 1–33)
ANION GAP SERPL CALCULATED.3IONS-SCNC: 15 MMOL/L (ref 5–15)
AST SERPL-CCNC: 16 U/L (ref 1–32)
BILIRUB SERPL-MCNC: 0.3 MG/DL (ref 0–1.2)
BUN SERPL-MCNC: 8 MG/DL (ref 6–20)
BUN/CREAT SERPL: 9.1 (ref 7–25)
CALCIUM SPEC-SCNC: 10.5 MG/DL (ref 8.6–10.5)
CHLORIDE SERPL-SCNC: 101 MMOL/L (ref 98–107)
CHOLEST SERPL-MCNC: 209 MG/DL (ref 0–200)
CO2 SERPL-SCNC: 22 MMOL/L (ref 22–29)
CREAT SERPL-MCNC: 0.88 MG/DL (ref 0.57–1)
DEPRECATED RDW RBC AUTO: 40.3 FL (ref 37–54)
EGFRCR SERPLBLD CKD-EPI 2021: 89.1 ML/MIN/1.73
ERYTHROCYTE [DISTWIDTH] IN BLOOD BY AUTOMATED COUNT: 13.6 % (ref 12.3–15.4)
GLOBULIN UR ELPH-MCNC: 3.4 GM/DL
GLUCOSE SERPL-MCNC: 105 MG/DL (ref 65–99)
HBA1C MFR BLD: 5.4 % (ref 4.8–5.6)
HCT VFR BLD AUTO: 44.2 % (ref 34–46.6)
HDLC SERPL-MCNC: 40 MG/DL (ref 40–60)
HGB BLD-MCNC: 14.9 G/DL (ref 12–15.9)
LDLC SERPL CALC-MCNC: 131 MG/DL (ref 0–100)
LDLC/HDLC SERPL: 3.15 {RATIO}
MCH RBC QN AUTO: 27.8 PG (ref 26.6–33)
MCHC RBC AUTO-ENTMCNC: 33.7 G/DL (ref 31.5–35.7)
MCV RBC AUTO: 82.5 FL (ref 79–97)
PLATELET # BLD AUTO: 383 10*3/MM3 (ref 140–450)
PMV BLD AUTO: 10.2 FL (ref 6–12)
POTASSIUM SERPL-SCNC: 4 MMOL/L (ref 3.5–5.2)
PROT SERPL-MCNC: 8 G/DL (ref 6–8.5)
RBC # BLD AUTO: 5.36 10*6/MM3 (ref 3.77–5.28)
SODIUM SERPL-SCNC: 138 MMOL/L (ref 136–145)
TRIGL SERPL-MCNC: 215 MG/DL (ref 0–150)
VLDLC SERPL-MCNC: 38 MG/DL (ref 5–40)
WBC NRBC COR # BLD: 16.37 10*3/MM3 (ref 3.4–10.8)

## 2023-11-05 PROBLEM — E78.2 MIXED HYPERLIPIDEMIA: Status: ACTIVE | Noted: 2023-11-05

## 2024-03-28 ENCOUNTER — LAB (OUTPATIENT)
Dept: LAB | Facility: HOSPITAL | Age: 34
End: 2024-03-28
Payer: COMMERCIAL

## 2024-03-28 ENCOUNTER — OFFICE VISIT (OUTPATIENT)
Dept: INTERNAL MEDICINE | Facility: CLINIC | Age: 34
End: 2024-03-28
Payer: COMMERCIAL

## 2024-03-28 VITALS
HEIGHT: 66 IN | OXYGEN SATURATION: 99 % | DIASTOLIC BLOOD PRESSURE: 78 MMHG | TEMPERATURE: 97.5 F | HEART RATE: 69 BPM | WEIGHT: 235 LBS | SYSTOLIC BLOOD PRESSURE: 112 MMHG | BODY MASS INDEX: 37.77 KG/M2

## 2024-03-28 DIAGNOSIS — E66.09 CLASS 2 OBESITY DUE TO EXCESS CALORIES WITHOUT SERIOUS COMORBIDITY WITH BODY MASS INDEX (BMI) OF 37.0 TO 37.9 IN ADULT: ICD-10-CM

## 2024-03-28 DIAGNOSIS — B35.1 ONYCHOMYCOSIS: ICD-10-CM

## 2024-03-28 DIAGNOSIS — F33.1 MODERATE EPISODE OF RECURRENT MAJOR DEPRESSIVE DISORDER: ICD-10-CM

## 2024-03-28 DIAGNOSIS — F41.9 ANXIETY: ICD-10-CM

## 2024-03-28 DIAGNOSIS — R22.9 SKIN MASS: Primary | ICD-10-CM

## 2024-03-28 LAB
ALBUMIN SERPL-MCNC: 4.1 G/DL (ref 3.5–5.2)
ALBUMIN/GLOB SERPL: 1.3 G/DL
ALP SERPL-CCNC: 70 U/L (ref 39–117)
ALT SERPL W P-5'-P-CCNC: 16 U/L (ref 1–33)
ANION GAP SERPL CALCULATED.3IONS-SCNC: 11.5 MMOL/L (ref 5–15)
AST SERPL-CCNC: 20 U/L (ref 1–32)
BILIRUB SERPL-MCNC: 0.2 MG/DL (ref 0–1.2)
BUN SERPL-MCNC: 7 MG/DL (ref 6–20)
BUN/CREAT SERPL: 8.5 (ref 7–25)
CALCIUM SPEC-SCNC: 9.9 MG/DL (ref 8.6–10.5)
CHLORIDE SERPL-SCNC: 103 MMOL/L (ref 98–107)
CO2 SERPL-SCNC: 25.5 MMOL/L (ref 22–29)
CREAT SERPL-MCNC: 0.82 MG/DL (ref 0.57–1)
EGFRCR SERPLBLD CKD-EPI 2021: 97 ML/MIN/1.73
GLOBULIN UR ELPH-MCNC: 3.1 GM/DL
GLUCOSE SERPL-MCNC: 83 MG/DL (ref 65–99)
POTASSIUM SERPL-SCNC: 4.1 MMOL/L (ref 3.5–5.2)
PROT SERPL-MCNC: 7.2 G/DL (ref 6–8.5)
SODIUM SERPL-SCNC: 140 MMOL/L (ref 136–145)

## 2024-03-28 PROCEDURE — 80053 COMPREHEN METABOLIC PANEL: CPT

## 2024-03-28 RX ORDER — TERBINAFINE HYDROCHLORIDE 250 MG/1
250 TABLET ORAL DAILY
Qty: 42 TABLET | Refills: 0 | Status: SHIPPED | OUTPATIENT
Start: 2024-03-28

## 2024-03-28 RX ORDER — DULOXETIN HYDROCHLORIDE 30 MG/1
30 CAPSULE, DELAYED RELEASE ORAL DAILY
Qty: 90 CAPSULE | Refills: 1 | Status: SHIPPED | OUTPATIENT
Start: 2024-03-28

## 2024-03-28 NOTE — PROGRESS NOTES
Internal Medicine Follow Up    Chief Complaint  Arminda Llanos is a 33 y.o. female who presents today for follow up of chronic medical conditions outlined below.    Chief Complaint   Patient presents with    Follow-up    mood    Cyst     Knot on back of neck        HPI  Ms. Llanos comes in today for follow up. She has been taking compounded semaglutide 40 units of 5mg/5ml vial. Losing weight successfully. Minimal nausea. Would like to have this prescribed so that she can use a savings card if possible. Mood has been more depressed the last several weeks. No change in stress level or trigger for change in mood. She notes previously needing a much higher dose of cymbalta so feels a dose increase may be needed. No SI. She notes that yesterday she felt the back of her head and it felt asymmetric. She notes a firm bump on the left just within the hair line. Nontender. She also reports ongoing toenail fungus on R foot. R big toenail fell off a week or two ago. The discoloration has spread now to the fourth and fifth toes as well.    Cyst  Associated symptoms include fatigue.        Review of Systems  Review of Systems   Constitutional:  Positive for fatigue.   Skin:  Positive for skin lesions.        Toenail fungus   Psychiatric/Behavioral:  Positive for depressed mood. Negative for self-injury and suicidal ideas.         Current Medications  Current Outpatient Medications on File Prior to Visit   Medication Sig Dispense Refill    [DISCONTINUED] DULoxetine (Cymbalta) 20 MG capsule Take 1 capsule by mouth Daily. 90 capsule 1    [DISCONTINUED] SEMAGLUTIDE,0.25 OR 0.5MG/DOS, SC       Semaglutide-Weight Management 1.7 MG/0.75ML solution auto-injector Inject 0.75 mL under the skin into the appropriate area as directed 1 (One) Time Per Week.      [DISCONTINUED] clotrimazole-betamethasone (LOTRISONE) 1-0.05 % cream       [DISCONTINUED] methylPREDNISolone (MEDROL) 4 MG dose pack        No current facility-administered  "medications on file prior to visit.       Allergies  Allergies   Allergen Reactions    Wellbutrin [Bupropion] Irritability       Objective  Visit Vitals  /78   Pulse 69   Temp 97.5 °F (36.4 °C) (Infrared)   Ht 167.6 cm (65.98\")   Wt 107 kg (235 lb)   SpO2 99%   BMI 37.95 kg/m²        Physical Exam  Physical Exam  Vitals and nursing note reviewed.   Constitutional:       General: She is not in acute distress.     Appearance: She is well-developed.   HENT:      Head: Normocephalic and atraumatic.   Eyes:      Conjunctiva/sclera: Conjunctivae normal.   Pulmonary:      Effort: Pulmonary effort is normal. No respiratory distress.   Feet:      Right foot:      Toenail Condition: Fungal disease present.     Comments: R great toenail absent. Fourth and fifth toenails thick and discolored.  Skin:     General: Skin is warm and dry.      Comments: Occiput slightly more prominent on L than R. Nontender. Firm but possibly mobile. No skin change but it is within the hairline limiting evaluation.   Neurological:      Mental Status: She is alert and oriented to person, place, and time.   Psychiatric:         Mood and Affect: Mood normal.         Behavior: Behavior normal.         Thought Content: Thought content normal.         Judgment: Judgment normal.         Results  Results for orders placed or performed in visit on 09/20/23   CBC (No Diff)    Specimen: Blood   Result Value Ref Range    WBC 16.37 (H) 3.40 - 10.80 10*3/mm3    RBC 5.36 (H) 3.77 - 5.28 10*6/mm3    Hemoglobin 14.9 12.0 - 15.9 g/dL    Hematocrit 44.2 34.0 - 46.6 %    MCV 82.5 79.0 - 97.0 fL    MCH 27.8 26.6 - 33.0 pg    MCHC 33.7 31.5 - 35.7 g/dL    RDW 13.6 12.3 - 15.4 %    RDW-SD 40.3 37.0 - 54.0 fl    MPV 10.2 6.0 - 12.0 fL    Platelets 383 140 - 450 10*3/mm3   Comprehensive Metabolic Panel    Specimen: Blood   Result Value Ref Range    Glucose 105 (H) 65 - 99 mg/dL    BUN 8 6 - 20 mg/dL    Creatinine 0.88 0.57 - 1.00 mg/dL    Sodium 138 136 - 145 mmol/L "    Potassium 4.0 3.5 - 5.2 mmol/L    Chloride 101 98 - 107 mmol/L    CO2 22.0 22.0 - 29.0 mmol/L    Calcium 10.5 8.6 - 10.5 mg/dL    Total Protein 8.0 6.0 - 8.5 g/dL    Albumin 4.6 3.5 - 5.2 g/dL    ALT (SGPT) 10 1 - 33 U/L    AST (SGOT) 16 1 - 32 U/L    Alkaline Phosphatase 77 39 - 117 U/L    Total Bilirubin 0.3 0.0 - 1.2 mg/dL    Globulin 3.4 gm/dL    A/G Ratio 1.4 g/dL    BUN/Creatinine Ratio 9.1 7.0 - 25.0    Anion Gap 15.0 5.0 - 15.0 mmol/L    eGFR 89.1 >60.0 mL/min/1.73   Lipid Panel    Specimen: Blood   Result Value Ref Range    Total Cholesterol 209 (H) 0 - 200 mg/dL    Triglycerides 215 (H) 0 - 150 mg/dL    HDL Cholesterol 40 40 - 60 mg/dL    LDL Cholesterol  131 (H) 0 - 100 mg/dL    VLDL Cholesterol 38 5 - 40 mg/dL    LDL/HDL Ratio 3.15    Hemoglobin A1c    Specimen: Blood   Result Value Ref Range    Hemoglobin A1C 5.40 4.80 - 5.60 %        Assessment and Plan  Diagnoses and all orders for this visit:    Skin mass  - will try to obtain US however in hair line so unsure if able to be accomplished. Would consider xray or CT if necessary.    Anxiety  Moderate episode of recurrent major depressive disorder  - increase in depression  - will increase cymbalta to 30mg daily    Class 2 obesity due to excess calories without serious comorbidity with body mass index (BMI) of 37.0 to 37.9 in adult  - successfully losing weight with compounded semaglutide. I believe dose is comparable to 1.7mg weekly.   - she will check with pharmacy to see if they have it in stock and if they do I will send script    Onychomycosis  - several nails on R foot. Has lost R great toenail.  - CMP today. If liver function normal she will start terbinafine 250mg daily x6 weeks.  - repeat CMP in 6 weeks and continue treatment for another 6 weeks    Health Maintenance  - Pap smear: 9/2020 negative, GYN left practice so she will call to schedule with new provider.  - Mammogram: Start screening at age 40.  - Colonoscopy: Start screening at age  45.  - HCV: negative  - Immunizations: COVID booster discussed. Flu deferred. Tdap 2/2020.  - Depression screening: negative 3/2024    Return in about 6 weeks (around 5/9/2024) for Follow up mood, Labs today.

## 2024-05-06 ENCOUNTER — HOSPITAL ENCOUNTER (OUTPATIENT)
Dept: ULTRASOUND IMAGING | Facility: HOSPITAL | Age: 34
Discharge: HOME | End: 2024-05-06
Admitting: INTERNAL MEDICINE
Payer: COMMERCIAL

## 2024-05-06 DIAGNOSIS — R22.9 SKIN MASS: ICD-10-CM

## 2024-05-06 PROCEDURE — 76536 US EXAM OF HEAD AND NECK: CPT

## 2024-05-09 ENCOUNTER — LAB (OUTPATIENT)
Dept: LAB | Facility: HOSPITAL | Age: 34
End: 2024-05-09
Payer: COMMERCIAL

## 2024-05-09 ENCOUNTER — OFFICE VISIT (OUTPATIENT)
Dept: INTERNAL MEDICINE | Facility: CLINIC | Age: 34
End: 2024-05-09
Payer: COMMERCIAL

## 2024-05-09 VITALS
BODY MASS INDEX: 36.61 KG/M2 | HEART RATE: 86 BPM | WEIGHT: 227.8 LBS | OXYGEN SATURATION: 99 % | DIASTOLIC BLOOD PRESSURE: 88 MMHG | TEMPERATURE: 98 F | HEIGHT: 66 IN | SYSTOLIC BLOOD PRESSURE: 126 MMHG

## 2024-05-09 DIAGNOSIS — L70.0 ACNE VULGARIS: ICD-10-CM

## 2024-05-09 DIAGNOSIS — B35.1 ONYCHOMYCOSIS: ICD-10-CM

## 2024-05-09 DIAGNOSIS — F33.1 MODERATE EPISODE OF RECURRENT MAJOR DEPRESSIVE DISORDER: ICD-10-CM

## 2024-05-09 DIAGNOSIS — F41.9 ANXIETY: Primary | ICD-10-CM

## 2024-05-09 PROCEDURE — 99214 OFFICE O/P EST MOD 30 MIN: CPT | Performed by: INTERNAL MEDICINE

## 2024-05-09 PROCEDURE — 80053 COMPREHEN METABOLIC PANEL: CPT

## 2024-05-09 RX ORDER — SPIRONOLACTONE 100 MG/1
100 TABLET, FILM COATED ORAL DAILY
COMMUNITY
Start: 2024-04-16

## 2024-05-09 RX ORDER — TERBINAFINE HYDROCHLORIDE 250 MG/1
250 TABLET ORAL DAILY
Qty: 42 TABLET | Refills: 0 | Status: SHIPPED | OUTPATIENT
Start: 2024-05-09

## 2024-05-10 LAB
ALBUMIN SERPL-MCNC: 4.7 G/DL (ref 3.5–5.2)
ALBUMIN/GLOB SERPL: 1.7 G/DL
ALP SERPL-CCNC: 59 U/L (ref 39–117)
ALT SERPL W P-5'-P-CCNC: 14 U/L (ref 1–33)
ANION GAP SERPL CALCULATED.3IONS-SCNC: 11 MMOL/L (ref 5–15)
AST SERPL-CCNC: 15 U/L (ref 1–32)
BILIRUB SERPL-MCNC: 0.2 MG/DL (ref 0–1.2)
BUN SERPL-MCNC: 8 MG/DL (ref 6–20)
BUN/CREAT SERPL: 8.9 (ref 7–25)
CALCIUM SPEC-SCNC: 9.7 MG/DL (ref 8.6–10.5)
CHLORIDE SERPL-SCNC: 104 MMOL/L (ref 98–107)
CO2 SERPL-SCNC: 25 MMOL/L (ref 22–29)
CREAT SERPL-MCNC: 0.9 MG/DL (ref 0.57–1)
EGFRCR SERPLBLD CKD-EPI 2021: 86.2 ML/MIN/1.73
GLOBULIN UR ELPH-MCNC: 2.7 GM/DL
GLUCOSE SERPL-MCNC: 81 MG/DL (ref 65–99)
POTASSIUM SERPL-SCNC: 4.4 MMOL/L (ref 3.5–5.2)
PROT SERPL-MCNC: 7.4 G/DL (ref 6–8.5)
SODIUM SERPL-SCNC: 140 MMOL/L (ref 136–145)

## 2024-09-12 NOTE — PROGRESS NOTES
"Subjective   Chief Complaint   Patient presents with    Follow-up     Pt c/o bumps around her clitoris X 4-5 days.  Pt states the bumps are it itchy.     Arminda Llanos is a 34 y.o. year old .  Patient's last menstrual period was 2024 (exact date).  She presents for new onset lesions around her clitoris. She reports they appeared about 4-5 days ago, and are very itchy. Her fiance has a history of herpes, but he has never had an outbreak while they have been together. She also reports labial fissures near where the clitoris is, as well as closer to the vaginal opening. Denies any pain or burning in the area. She denies any history of HSV or any other STDs. She reports a history of HPV on feet and hands, but not on genitalia. Reports there was slight bleeding from where the area was irritated. Denies trauma to the area, and has not tried any creams for this.     The following portions of the patient's history were reviewed and updated as appropriate:current medications, allergies, and past medical history     Social History    Tobacco Use      Smoking status: Former        Packs/day: 0.00        Years: 0.5 packs/day for 7.0 years (3.5 ttl pk-yrs)        Types: Cigarettes        Start date: 2009        Quit date: 2016        Years since quittin.1        Passive exposure: Past      Smokeless tobacco: Never      Tobacco comments: 2016 quit         Objective   /68 (BP Location: Left arm, Patient Position: Sitting, Cuff Size: Large Adult)   Pulse 72   Ht 167.6 cm (66\")   Wt 108 kg (237 lb 12.8 oz)   LMP 2024 (Exact Date)   Breastfeeding No   BMI 38.38 kg/m²     General:  well developed; well nourished  no acute distress  mentation appropriate   Lungs:  breathing is unlabored   Heart:  Not performed.   Pelvis: Clinical staff was present for exam  External genitalia:  multiple, sub centimeter lesions superior to the clitoris, vesicular in appearance, with large, left labia " minora fissure extending from the superior labia to the level of the urethra; multiple, 1mm smaller vesicles noted in the inferior left labia majora; all areas tender to touch   :  urethral meatus normal;  Vaginal:  normal pink mucosa without prolapse or lesions. discharge present -  white and thick;  Cervix:  normal appearance.     Lab Review   No data reviewed    Imaging   No data reviewed        Assessment   Labial lesions  Vaginal discharge      Plan   HSV swab taken for multiple lesion areas, and leukorrhea swab collected from cervix and vaginal vault.  Will empirically treat for primary herpes outbreak with Valtrex 1 g twice a day for 7 days, along with a yeast infection with Diflucan daily for 2 days.  Will adjust treatment based on swab results.  Recommend abstaining from intercourse until lesions have healed, and we will bring patient back in 1 week for repeat vaginal exam.   The importance of keeping all planned follow-up and taking all medications as prescribed was emphasized.  Follow up 1 week or sooner PRN     New Medications Ordered This Visit   Medications    fluconazole (Diflucan) 200 MG tablet     Sig: Take 1 tablet by mouth Daily for 2 days.     Dispense:  2 tablet     Refill:  0    valACYclovir (Valtrex) 1000 MG tablet     Sig: Take 1 tablet by mouth 2 (Two) Times a Day for 7 days.     Dispense:  14 tablet     Refill:  0          This note was electronically signed.    Tatiana Martinez MD  September 13, 2024

## 2024-09-13 ENCOUNTER — OFFICE VISIT (OUTPATIENT)
Dept: OBSTETRICS AND GYNECOLOGY | Facility: CLINIC | Age: 34
End: 2024-09-13
Payer: COMMERCIAL

## 2024-09-13 VITALS
DIASTOLIC BLOOD PRESSURE: 68 MMHG | HEIGHT: 66 IN | BODY MASS INDEX: 38.22 KG/M2 | SYSTOLIC BLOOD PRESSURE: 110 MMHG | WEIGHT: 237.8 LBS | HEART RATE: 72 BPM

## 2024-09-13 DIAGNOSIS — N89.8 VAGINAL DISCHARGE: ICD-10-CM

## 2024-09-13 DIAGNOSIS — N90.89 LABIAL LESION: Primary | ICD-10-CM

## 2024-09-13 RX ORDER — FLUCONAZOLE 200 MG/1
200 TABLET ORAL DAILY
Qty: 2 TABLET | Refills: 0 | Status: SHIPPED | OUTPATIENT
Start: 2024-09-13 | End: 2024-09-15

## 2024-09-13 RX ORDER — VALACYCLOVIR HYDROCHLORIDE 1 G/1
1000 TABLET, FILM COATED ORAL 2 TIMES DAILY
Qty: 14 TABLET | Refills: 0 | Status: SHIPPED | OUTPATIENT
Start: 2024-09-13 | End: 2024-09-20

## 2024-09-13 RX ORDER — SPIRONOLACTONE 50 MG/1
50 TABLET, FILM COATED ORAL DAILY
COMMUNITY
Start: 2024-08-26

## 2024-09-17 ENCOUNTER — TELEPHONE (OUTPATIENT)
Dept: OBSTETRICS AND GYNECOLOGY | Facility: CLINIC | Age: 34
End: 2024-09-17
Payer: COMMERCIAL

## 2024-09-20 ENCOUNTER — OFFICE VISIT (OUTPATIENT)
Dept: OBSTETRICS AND GYNECOLOGY | Facility: CLINIC | Age: 34
End: 2024-09-20
Payer: COMMERCIAL

## 2024-09-20 VITALS
BODY MASS INDEX: 38.25 KG/M2 | HEART RATE: 80 BPM | SYSTOLIC BLOOD PRESSURE: 120 MMHG | DIASTOLIC BLOOD PRESSURE: 70 MMHG | WEIGHT: 238 LBS | HEIGHT: 66 IN

## 2024-09-20 DIAGNOSIS — F33.1 MODERATE EPISODE OF RECURRENT MAJOR DEPRESSIVE DISORDER: ICD-10-CM

## 2024-09-20 DIAGNOSIS — F41.9 ANXIETY: ICD-10-CM

## 2024-09-20 DIAGNOSIS — A60.04 HERPES, VULVAR: Primary | ICD-10-CM

## 2024-09-20 RX ORDER — VALACYCLOVIR HYDROCHLORIDE 1 G/1
1000 TABLET, FILM COATED ORAL 2 TIMES DAILY
Qty: 30 TABLET | Refills: 11 | Status: SHIPPED | OUTPATIENT
Start: 2024-09-20

## 2024-09-20 RX ORDER — DULOXETIN HYDROCHLORIDE 30 MG/1
30 CAPSULE, DELAYED RELEASE ORAL DAILY
Qty: 30 CAPSULE | Refills: 0 | Status: SHIPPED | OUTPATIENT
Start: 2024-09-20

## 2024-10-02 ENCOUNTER — OFFICE VISIT (OUTPATIENT)
Dept: INTERNAL MEDICINE | Facility: CLINIC | Age: 34
End: 2024-10-02
Payer: COMMERCIAL

## 2024-10-02 VITALS
HEART RATE: 77 BPM | BODY MASS INDEX: 37.45 KG/M2 | OXYGEN SATURATION: 99 % | TEMPERATURE: 97.2 F | HEIGHT: 66 IN | SYSTOLIC BLOOD PRESSURE: 124 MMHG | DIASTOLIC BLOOD PRESSURE: 82 MMHG | WEIGHT: 233 LBS

## 2024-10-02 DIAGNOSIS — Z00.00 ANNUAL PHYSICAL EXAM: Primary | ICD-10-CM

## 2024-10-02 DIAGNOSIS — L70.0 ACNE VULGARIS: ICD-10-CM

## 2024-10-02 DIAGNOSIS — F51.01 PRIMARY INSOMNIA: ICD-10-CM

## 2024-10-02 DIAGNOSIS — F41.9 ANXIETY: ICD-10-CM

## 2024-10-02 DIAGNOSIS — Z23 NEED FOR INFLUENZA VACCINATION: ICD-10-CM

## 2024-10-02 DIAGNOSIS — E78.2 MIXED HYPERLIPIDEMIA: ICD-10-CM

## 2024-10-02 DIAGNOSIS — B35.1 ONYCHOMYCOSIS: ICD-10-CM

## 2024-10-02 DIAGNOSIS — E66.812 CLASS 2 OBESITY DUE TO EXCESS CALORIES WITHOUT SERIOUS COMORBIDITY WITH BODY MASS INDEX (BMI) OF 37.0 TO 37.9 IN ADULT: ICD-10-CM

## 2024-10-02 DIAGNOSIS — A60.04 HERPES, VULVAR: ICD-10-CM

## 2024-10-02 DIAGNOSIS — L73.2 HIDRADENITIS SUPPURATIVA: ICD-10-CM

## 2024-10-02 DIAGNOSIS — F33.1 MODERATE EPISODE OF RECURRENT MAJOR DEPRESSIVE DISORDER: ICD-10-CM

## 2024-10-02 DIAGNOSIS — E66.09 CLASS 2 OBESITY DUE TO EXCESS CALORIES WITHOUT SERIOUS COMORBIDITY WITH BODY MASS INDEX (BMI) OF 37.0 TO 37.9 IN ADULT: ICD-10-CM

## 2024-10-02 PROCEDURE — 90471 IMMUNIZATION ADMIN: CPT | Performed by: INTERNAL MEDICINE

## 2024-10-02 PROCEDURE — 90656 IIV3 VACC NO PRSV 0.5 ML IM: CPT | Performed by: INTERNAL MEDICINE

## 2024-10-02 PROCEDURE — 99214 OFFICE O/P EST MOD 30 MIN: CPT | Performed by: INTERNAL MEDICINE

## 2024-10-02 PROCEDURE — 99395 PREV VISIT EST AGE 18-39: CPT | Performed by: INTERNAL MEDICINE

## 2024-10-02 RX ORDER — TRAZODONE HYDROCHLORIDE 50 MG/1
25-50 TABLET, FILM COATED ORAL NIGHTLY PRN
Qty: 90 TABLET | Refills: 3 | Status: SHIPPED | OUTPATIENT
Start: 2024-10-02

## 2024-10-02 RX ORDER — DULOXETIN HYDROCHLORIDE 30 MG/1
30 CAPSULE, DELAYED RELEASE ORAL DAILY
Qty: 90 CAPSULE | Refills: 3 | Status: SHIPPED | OUTPATIENT
Start: 2024-10-02

## 2024-10-02 NOTE — PROGRESS NOTES
Internal Medicine Annual Exam  Arminda Llanos is a 34 y.o. female who presents today for an annual exam and with concerns as outlined below.    Chief Complaint  Chief Complaint   Patient presents with    Annual Exam    Insomnia        HPI  Ms. Alexis comes in today for physical. She notes that she has been doing well aside from insomnia. Has difficulty falling asleep and feels that she never gets good rest. She can take 30 minutes to several hours to fall asleep. Previously had smoked marijuana to help with sleep but quit a month ago and this is when she noticed the insomnia recurring. It has not improved over the last month. Took trazodone while in college and it worked well. She is UTD with dental and vision exams. She has upcoming GYN exam and did recently see GYN due to new onset genital HSV. Using valtrex PRN. She continues on compounded semaglutide. She notes that mood has been stable on cymbalta. She is not smoking, vaping, drinking alcohol, or using illicit substances. She will get flu shot today. Deferred COVID vaccine due to recently having COVID infection about a month ago.         Review of Systems  Review of Systems   Psychiatric/Behavioral:  Positive for sleep disturbance.    All other systems reviewed and are negative.       Past Medical History  Past Medical History:   Diagnosis Date    Anxiety 2011    COVID-19 08/01/2022    Decreased body weight 02/13/2020    Depression     Headache     Herpes     Obesity 2013    Onychomycosis 09/14/2022    Urinary tract infection         Surgical History  Past Surgical History:   Procedure Laterality Date    TONSILLECTOMY Bilateral 07/2011    WISDOM TOOTH EXTRACTION Bilateral 2010        Family History  Family History   Problem Relation Age of Onset    Breast cancer Mother 50        s/p double mastectomy    Depression Mother     Anxiety disorder Mother     Cancer Mother         breast cancer    Diabetes Father     Diabetes Maternal Grandmother     Diabetes  "Maternal Grandfather     Brain cancer Maternal Grandfather     Cancer Maternal Grandfather         brain cancer    Diabetes Paternal Grandmother     Diabetes Paternal Grandfather     Depression Sister     Anxiety disorder Sister     Liver disease Half-Brother         fatty liver    Colon cancer Neg Hx     Osteoporosis Neg Hx         Social History  Social History     Socioeconomic History    Marital status: Significant Other   Tobacco Use    Smoking status: Former     Current packs/day: 0.00     Average packs/day: 0.5 packs/day for 7.0 years (3.5 ttl pk-yrs)     Types: Cigarettes     Start date: 2009     Quit date: 2016     Years since quittin.1     Passive exposure: Past    Smokeless tobacco: Never    Tobacco comments:     2016 quit   Vaping Use    Vaping status: Never Used   Substance and Sexual Activity    Alcohol use: Never    Drug use: Not Currently     Types: Marijuana     Comment: previously smoked marijuana, no marijuana use x1 month    Sexual activity: Yes     Partners: Male     Birth control/protection: Coitus interruptus        Current Medications  Current Outpatient Medications on File Prior to Visit   Medication Sig Dispense Refill    SEMAGLUTIDE-WEIGHT MANAGEMENT SC Inject  under the skin into the appropriate area as directed.      spironolactone (ALDACTONE) 50 MG tablet Take 3 tablets by mouth Daily.      valACYclovir (Valtrex) 1000 MG tablet Take 1 tablet by mouth 2 (Two) Times a Day. 30 tablet 11    [DISCONTINUED] DULoxetine (CYMBALTA) 30 MG capsule TAKE 1 CAPSULE BY MOUTH DAILY 30 capsule 0    TRETINOIN EX Apply  topically.      [DISCONTINUED] spironolactone (ALDACTONE) 100 MG tablet Take 1 tablet by mouth Daily.       No current facility-administered medications on file prior to visit.       Allergies  Allergies   Allergen Reactions    Wellbutrin [Bupropion] Irritability        Objective  Visit Vitals  /82   Pulse 77   Temp 97.2 °F (36.2 °C)   Ht 167.6 cm (66\")   Wt 106 kg (233 " lb)   LMP 09/25/2024 (Exact Date)   SpO2 99% Comment: ra   BMI 37.61 kg/m²        Physical Exam  Physical Exam  Vitals and nursing note reviewed.   Constitutional:       General: She is not in acute distress.     Appearance: She is well-developed. She is obese. She is not ill-appearing, toxic-appearing or diaphoretic.   HENT:      Head: Normocephalic and atraumatic.      Right Ear: Tympanic membrane, ear canal and external ear normal.      Left Ear: Tympanic membrane, ear canal and external ear normal.      Nose: Nose normal.   Eyes:      General: No scleral icterus.     Conjunctiva/sclera: Conjunctivae normal.   Neck:      Thyroid: No thyromegaly.   Cardiovascular:      Rate and Rhythm: Normal rate and regular rhythm.      Heart sounds: Normal heart sounds. No murmur heard.  Pulmonary:      Effort: Pulmonary effort is normal. No respiratory distress.      Breath sounds: Normal breath sounds.   Abdominal:      General: There is no distension.      Palpations: Abdomen is soft. There is no mass.      Tenderness: There is no abdominal tenderness.   Musculoskeletal:         General: No deformity.      Cervical back: Neck supple.      Right lower leg: No edema.      Left lower leg: No edema.   Lymphadenopathy:      Cervical: No cervical adenopathy.   Skin:     General: Skin is warm and dry.   Neurological:      Mental Status: She is alert and oriented to person, place, and time. Mental status is at baseline.      Gait: Gait normal.   Psychiatric:         Mood and Affect: Mood normal.         Behavior: Behavior normal.         Thought Content: Thought content normal.         Judgment: Judgment normal.          Results  Results for orders placed or performed in visit on 05/09/24   Comprehensive Metabolic Panel    Specimen: Blood   Result Value Ref Range    Glucose 81 65 - 99 mg/dL    BUN 8 6 - 20 mg/dL    Creatinine 0.90 0.57 - 1.00 mg/dL    Sodium 140 136 - 145 mmol/L    Potassium 4.4 3.5 - 5.2 mmol/L    Chloride 104 98 -  107 mmol/L    CO2 25.0 22.0 - 29.0 mmol/L    Calcium 9.7 8.6 - 10.5 mg/dL    Total Protein 7.4 6.0 - 8.5 g/dL    Albumin 4.7 3.5 - 5.2 g/dL    ALT (SGPT) 14 1 - 33 U/L    AST (SGOT) 15 1 - 32 U/L    Alkaline Phosphatase 59 39 - 117 U/L    Total Bilirubin 0.2 0.0 - 1.2 mg/dL    Globulin 2.7 gm/dL    A/G Ratio 1.7 g/dL    BUN/Creatinine Ratio 8.9 7.0 - 25.0    Anion Gap 11.0 5.0 - 15.0 mmol/L    eGFR 86.2 >60.0 mL/min/1.73        Assessment and Plan  Diagnoses and all orders for this visit:    Annual physical exam  - Counseling was given to patient for the following topics:  importance of immunizations, including risks and benefits. Also discussed the importance of regular dental and vision care.  -     CBC (No Diff); Future  -     Comprehensive Metabolic Panel; Future  -     Hemoglobin A1c; Future  -     TSH Rfx On Abnormal To Free T4; Future    Anxiety  Moderate episode of recurrent major depressive disorder  - effexor lost efficacy, doing much better on cymbalta 30mg daily, will continue.  - continue counseling     Primary insomnia  - will start back on trazodone 25-50mg qhs PRN which has helped her in the past  - sleep hygiene discussed    Class 2 severe obesity due to excess calories without serious comorbidity with body mass index (BMI) of 38.0 to 38.9 in adult  - on compounded semaglutide from weight loss clinic     Hidradenitis suppurativa  - currently asymptomatic     Onychomycosis  - resolved s/p terbinafine    Acne vulgaris  - following with dermatology, on topical tretinoin and aldactone 150mg daily    Mixed hyperlipidemia  - update lipid panel    Herpes, vulvar  - managed with valtrex PRN by GYN    Need for influenza vaccination  -     Fluzone >6mos      Health Maintenance   Topic Date Due    PAP SMEAR  09/02/2023    LIPID PANEL  09/20/2024    COVID-19 Vaccine (4 - 2023-24 season) 10/04/2024 (Originally 9/1/2024)    BMI FOLLOWUP  05/09/2025    ANNUAL PHYSICAL  10/02/2025    TDAP/TD VACCINES (2 - Td or  Tdap) 02/13/2030    HEPATITIS C SCREENING  Completed    INFLUENZA VACCINE  Completed    Pneumococcal Vaccine 0-64  Aged Out      Health Maintenance  - Pap smear: upcoming appt with GYN  - Mammogram: Start screening at age 40.  - Colonoscopy: Start screening at age 45.  - HCV: negative  - Immunizations: COVID booster discussed. Flu today. Tdap 2/2020.  - Depression screening: negative 3/2024    Return in about 3 months (around 1/2/2025) for Follow up insomnia, 1 year for annual, Labs today.

## 2024-10-03 ENCOUNTER — LAB (OUTPATIENT)
Dept: LAB | Facility: HOSPITAL | Age: 34
End: 2024-10-03
Payer: COMMERCIAL

## 2024-10-03 DIAGNOSIS — Z00.00 ANNUAL PHYSICAL EXAM: ICD-10-CM

## 2024-10-03 DIAGNOSIS — E78.2 MIXED HYPERLIPIDEMIA: ICD-10-CM

## 2024-10-03 LAB
ALBUMIN SERPL-MCNC: 4.4 G/DL (ref 3.5–5.2)
ALBUMIN/GLOB SERPL: 1.5 G/DL
ALP SERPL-CCNC: 54 U/L (ref 39–117)
ALT SERPL W P-5'-P-CCNC: 14 U/L (ref 1–33)
ANION GAP SERPL CALCULATED.3IONS-SCNC: 12.1 MMOL/L (ref 5–15)
AST SERPL-CCNC: 19 U/L (ref 1–32)
BILIRUB SERPL-MCNC: 0.6 MG/DL (ref 0–1.2)
BUN SERPL-MCNC: 8 MG/DL (ref 6–20)
BUN/CREAT SERPL: 7.8 (ref 7–25)
CALCIUM SPEC-SCNC: 9.8 MG/DL (ref 8.6–10.5)
CHLORIDE SERPL-SCNC: 103 MMOL/L (ref 98–107)
CHOLEST SERPL-MCNC: 186 MG/DL (ref 0–200)
CO2 SERPL-SCNC: 23.9 MMOL/L (ref 22–29)
CREAT SERPL-MCNC: 1.02 MG/DL (ref 0.57–1)
DEPRECATED RDW RBC AUTO: 42 FL (ref 37–54)
EGFRCR SERPLBLD CKD-EPI 2021: 74.2 ML/MIN/1.73
ERYTHROCYTE [DISTWIDTH] IN BLOOD BY AUTOMATED COUNT: 14.1 % (ref 12.3–15.4)
GLOBULIN UR ELPH-MCNC: 2.9 GM/DL
GLUCOSE SERPL-MCNC: 81 MG/DL (ref 65–99)
HBA1C MFR BLD: 5.2 % (ref 4.8–5.6)
HCT VFR BLD AUTO: 41.9 % (ref 34–46.6)
HDLC SERPL-MCNC: 38 MG/DL (ref 40–60)
HGB BLD-MCNC: 14.2 G/DL (ref 12–15.9)
LDLC SERPL CALC-MCNC: 123 MG/DL (ref 0–100)
LDLC/HDLC SERPL: 3.16 {RATIO}
MCH RBC QN AUTO: 28.5 PG (ref 26.6–33)
MCHC RBC AUTO-ENTMCNC: 33.9 G/DL (ref 31.5–35.7)
MCV RBC AUTO: 84.1 FL (ref 79–97)
PLATELET # BLD AUTO: 292 10*3/MM3 (ref 140–450)
PMV BLD AUTO: 9.6 FL (ref 6–12)
POTASSIUM SERPL-SCNC: 4.4 MMOL/L (ref 3.5–5.2)
PROT SERPL-MCNC: 7.3 G/DL (ref 6–8.5)
RBC # BLD AUTO: 4.98 10*6/MM3 (ref 3.77–5.28)
SODIUM SERPL-SCNC: 139 MMOL/L (ref 136–145)
TRIGL SERPL-MCNC: 139 MG/DL (ref 0–150)
TSH SERPL DL<=0.05 MIU/L-ACNC: 1.07 UIU/ML (ref 0.27–4.2)
VLDLC SERPL-MCNC: 25 MG/DL (ref 5–40)
WBC NRBC COR # BLD AUTO: 7.49 10*3/MM3 (ref 3.4–10.8)

## 2024-10-03 PROCEDURE — 83036 HEMOGLOBIN GLYCOSYLATED A1C: CPT

## 2024-10-03 PROCEDURE — 80061 LIPID PANEL: CPT

## 2024-10-03 PROCEDURE — 80050 GENERAL HEALTH PANEL: CPT

## 2024-10-31 NOTE — PROGRESS NOTES
Subjective   Chief Complaint   Patient presents with    Gynecologic Exam     Annual, pt c/o vaginal irritation since this morning.     Arminda Llanos is a 34 y.o. year old  presenting to be seen for her annual exam. She is doing well, but noticed vaginal irritation this morning. She states she thinks at may be a yeast infection. She states its almost like the lips are chapped and split. Denies any itching or rashes. She denies changing soaps or laundry detergent. She uses unscented feminine soap.     She is getting  next year!     Patient reports her mother had breast cancer, and was possibly positive for BRCA (unsure which one). She was diagnosed in her 50s. Mother will be getting genetic testing in January.  Maternal uncle with colon cancer, as well.     SEXUAL Hx:  She is currently sexually active.  In the past year there there has been NO new sexual partners.    Condoms are never used.  She would not like to be screened for STD's at today's exam.  Current birth control method: not using any form of contraception.  She is not trying to conceive but would be OK if she did get pregnant.  She is happy with her current method of contraception and does not want to discuss alternative methods of contraception.  MENSTRUAL Hx:  Patient's last menstrual period was 10/27/2024 (exact date).  In the past 6 months her cycles have been regular, predictable and occur monthly.  Her menstrual flow is typically normal.   Each month on average there are roughly 2 day(s) of very heavy flow.  Intermenstrual bleeding is absent.    Post-coital bleeding is absent.  Dysmenorrhea: none  PMS: none  Her cycles are not a source of concern for her that she wishes to discuss today.  HEALTH Hx:  She exercises regularly: yes. Walking about 3 days a week, but will be planning to do more.   She wears her seat belt: yes.  She has concerns about domestic violence: no.      The following portions of the patient's history were  "reviewed and updated as appropriate:problem list, current medications, allergies, past family history, past medical history, past social history, and past surgical history.    Social History    Tobacco Use      Smoking status: Former        Packs/day: 0.00        Years: 0.5 packs/day for 7.0 years (3.5 ttl pk-yrs)        Types: Cigarettes        Start date: 2009        Quit date: 2016        Years since quittin.2        Passive exposure: Past      Smokeless tobacco: Never      Tobacco comments: 2016 quit         Objective   /74 (BP Location: Right arm, Patient Position: Sitting, Cuff Size: Large Adult)   Ht 167.6 cm (65.98\")   Wt 106 kg (234 lb 6.4 oz)   LMP 10/27/2024 (Exact Date)   Breastfeeding No   BMI 37.85 kg/m²     General:  well developed; well nourished  no acute distress  mentation appropriate  obese - Body mass index is 37.85 kg/m².   Breasts:  Examined in supine position  Symmetric without masses or skin dimpling  Nipples normal without inversion, lesions or discharge  There are no palpable axillary nodes   Pelvis: Exam limited by  body habitus  Clinical staff was present for exam  External genitalia:  normal appearance of the external genitalia including Bartholin's and [place]'s glands.  :  urethral meatus normal;  Vaginal:  normal pink mucosa without prolapse or lesions. Slight erythema on the right inner labia minora; white/thick vaginal discharge noted   Cervix:  normal appearance.  Uterus:  not palpable.  Adnexa:  non palpable bilaterally.  Rectal:  digital rectal exam not performed; anus visually normal appearing.        Assessment   Annual GYN exam   History of HSV  Mother with breast cancer and possible BRCA positive   She is up to date on all relevant gynecologic and colorectal screenings     Plan   Pap and HPV with yeast infection screening were done today.  If she does not receive the results of the Pap within 2 weeks  time, she was instructed to call to find out the " results.  I explained to Arminda that the recommendations for Pap smear interval in a low risk patient's has lengthened to 5 years time if both cytology and HPV testing were normal.  I encouraged her to be seen yearly for a full physical exam including breast and pelvic exam even during the off years when PAP's will not be performed.    Will empirically treat for a yeast infection with Diflucan x 2 doses.  Will adjust treatment based on results.  Extensively discussed BRCA 1 and 2 testing and implications if positive.  Patient voiced understanding and referral to genetic counseling placed today.  Also discussed risks of conceiving at this time, as she is considered AMA.  Risks including but limited to: Miscarriage, stillborn, genetic abnormalities, preeclampsia, gestational diabetes, etc.  Recommended weight loss and starting a daily prenatal vitamin now.  Patient voiced understanding.  The importance of keeping all planned follow-up and taking all medications as prescribed was emphasized.  Follow up for annual exam in 1 year or sooner PRN     New Medications Ordered This Visit   Medications    fluconazole (Diflucan) 200 MG tablet     Sig: Take 1 tablet by mouth Daily for 2 days.     Dispense:  2 tablet     Refill:  0          This note was electronically signed.    Tatiana Martinez MD   November 1, 2024

## 2024-11-01 ENCOUNTER — OFFICE VISIT (OUTPATIENT)
Dept: OBSTETRICS AND GYNECOLOGY | Facility: CLINIC | Age: 34
End: 2024-11-01
Payer: COMMERCIAL

## 2024-11-01 VITALS
HEIGHT: 66 IN | WEIGHT: 234.4 LBS | SYSTOLIC BLOOD PRESSURE: 118 MMHG | BODY MASS INDEX: 37.67 KG/M2 | DIASTOLIC BLOOD PRESSURE: 74 MMHG

## 2024-11-01 DIAGNOSIS — Z84.81 FAMILY HISTORY OF BRCA GENE MUTATION: ICD-10-CM

## 2024-11-01 DIAGNOSIS — Z01.419 WOMEN'S ANNUAL ROUTINE GYNECOLOGICAL EXAMINATION: Primary | ICD-10-CM

## 2024-11-01 DIAGNOSIS — N89.8 VAGINAL IRRITATION: ICD-10-CM

## 2024-11-01 RX ORDER — FLUCONAZOLE 200 MG/1
200 TABLET ORAL DAILY
Qty: 2 TABLET | Refills: 0 | Status: SHIPPED | OUTPATIENT
Start: 2024-11-01 | End: 2024-11-03

## 2024-11-11 LAB — REF LAB TEST METHOD: NORMAL

## 2024-11-11 RX ORDER — BORIC ACID
600 POWDER (GRAM) MISCELLANEOUS NIGHTLY
Qty: 21 SUPPOSITORY | Refills: 0 | Status: SHIPPED | OUTPATIENT
Start: 2024-11-11 | End: 2024-12-02

## 2025-01-23 ENCOUNTER — E-VISIT (OUTPATIENT)
Dept: FAMILY MEDICINE CLINIC | Facility: TELEHEALTH | Age: 35
End: 2025-01-23
Payer: COMMERCIAL

## 2025-01-23 PROCEDURE — FABRICHEALTHVISIT: Performed by: NURSE PRACTITIONER

## 2025-01-23 NOTE — E-VISIT TREATED
Date: 2025 14:02:36  Clinician: Angie Cota  Clinician NPI: [id number]  Patient: Arminda Llanos  Patient : 1990  Patient Address: [address] [place] [address]  Patient Phone: [phone]  Visit Protocol: URI  Patient Summary:  Arminda is a 34 year old ( : 1990 ) female who initiated a visit for cold, sinus infection, or influenza.     Arminda states the symptoms started gradually 6 days ago.   Symptom start date: 2025   The symptoms consist of a   headache, ear pain, enlarged lymph nodes, a sore throat, a cough, and facial pain or pressure.   Symptom details     Cough: Arminda coughs a few times an hour and the cough is more bothersome at night. Phlegm does not come into the throat when coughing. Arminda believes the cough is caused by post-nasal drip.     Sore throat: Arminda reports having moderate throat pain (4-6 on a 10 point pain scale), does not have exudate on the tonsils, and can swallow liquids without any difficulty. The lymph nodes in the neck are enlarged. The lymph node swelling is worse on one   side and the swelling has not caused changes in speech or hoarseness in the voice. A rash has not appeared on the skin since the sore throat started. Patient reports feeling pain in the front of the neck that sometimes moves to the ear.     Facial pain or pressure: The facial pain or pressure does not feel worse when bending or leaning forward.     Headache: The headache is mild (1-3 on a 10 point pain scale).      Arminda denies having rhinitis, vomiting, myalgias, anosmia and ageusia, nasal congestion, nausea, chills, diarrhea, teeth pain, fever, wheezing, and malaise. Arminda also denies experiencing difficulty opening their mouth due to pain or swelling in the jaw   muscles, having recent facial or sinus surgery in the past 60 days, taking antibiotic medication in the past month, and double sickening (worsening symptoms after initial improvement). Arminda is not experiencing dyspnea.   Precipitating  events  Within the   past week, Arminda has not been exposed to someone with strep throat. Arminda has not recently been exposed to someone with influenza. Arminda has been in close contact with the following high risk individuals: people with asthma, heart disease or diabetes and   adults 65 or older.    Arminda has received the influenza vaccine more than 2 weeks ago.   Pertinent COVID-19 (Coronavirus) information  Since the symptoms started, Arminda has tested for COVID-19. The result of the test was negative. The negative result was   within the last 48 hours.   Arminda has not received a COVID-19 vaccine in the past year.   Pertinent medical history    Arminda reports having the following conditions:   Mental health conditions    Arminda typically gets a yeast infection when an antibiotic is   taken. Arminda has successfully used Diflucan to treat previous yeast infections. 2 doses of fluconazole (Diflucan) has typically been needed for symptoms to resolve in the past.  A provider has not told Arminda to avoid NSAIDs.   Arminda does not have diabetes. Arminda   denies having immunosuppressive conditions (e.g., chemotherapy, HIV, organ transplant, long-term use of steroids or other immunosuppressive medications, splenectomy). Arminda does not have asthma.   Arminda does not smoke or use smokeless tobacco. Arminda does not   vape or use other e-cigarette products.   Arminda denies pregnancy and denies breastfeeding.   Additional information as reported by the patient (free text): I am having severe ear pain as well. I believe I have an ear infection. The lymph nodes in front of   my ear are very swollen along with the ones in my neck on the left side of my body.   Weight: 234 lbs (106.14 kg)    MEDICATIONS: tirzepatide (weight loss) subcutaneous, duloxetine oral, ALLERGIES: bupropion  Clinician Response:  Dear Arminda,  Based on the information provided, you have acute bacterial sinusitis, also known as a sinus infection. Most cases of sinus infections are caused by viruses and  symptoms start to improve on their own in 7-10 days. Both   viral and bacterial sinus infections usually resolve on its own. A bacterial infection may have developed if any of the following apply to you.      Symptoms of sinus infection lasting 10 days or more without showing any improvement    If you feel better after a viral upper respiratory infection such as, a cold but then suddenly feel worse with symptoms such as fever, headache, or increase in nasal discharge     Medication information  I am prescribing:       Fluticasone 50 mcg/actuation nasal spray. Inhale 2 sprays in each nostril 1 time per day; after 1 week, may adjust to 1 - 2 sprays in each nostril 1 time per day. This medication takes several days to start working, so keep taking it even if it doesn't   help right away. There are no refills with this prescription.      Amoxicillin-pot clavulanate 875-125 mg oral tablet. Take 1 tablet by mouth every 12 hours for 10 days. There are no refills with this prescription.      Brompheniramine-pseudoeph-DM (Bromfed DM) 2-30-10 mg/5mL oral syrup. Take 10 milliliters by mouth every 4 hours as needed for cough. Do not take more than 60mL in 24 hours. There are no refills with this prescription.     Because you usually get a yeast infection when taking antibiotics, I am also prescribing:     Fluconazole (Diflucan) 150 mg oral tablet. Take 1 tablet by mouth in a single dose. Repeat dose in 3 days if symptoms are still present. There are no refills with this prescription.   If you become pregnant during this course of treatment, stop taking   the medication and contact your primary care provider.  Tips for using a nasal spray:     When the medication is being sprayed in your nose, point the tip of the nasal spray towards your ear.    Do not blow your nose after using the spray.    Do not lean your head back after using the spray as it will go down your throat.    Wipe the tip of the nose piece after use with a dry,  clean tissue.     Self care  Steps you can take to be as comfortable as possible:     Rest.    Drink plenty of fluids.    Use throat lozenges.    Suck on frozen items such as popsicles.    Drink hot tea with lemon and honey.    Gargle with warm salt water (1/4 teaspoon of salt per 8 ounce glass of water).    Take a spoonful of honey to reduce your cough.     When to seek care  Please be seen in a clinic or urgent care if any of the following occur:     New symptoms develop, or symptoms become worse    Symptoms do not start to improve after 3 days of treatment     Call 911 or go to the emergency room if any of the following occur:     Difficulty breathing    If you feel that your throat is closing off    Suddenly develop a rash    Unable to swallow fluids or are drooling     It is possible to have an allergic reaction to an antibiotic even if you have not had one in the past. If you notice a new rash, significant swelling, or difficulty breathing, stop taking this medication immediately and go to a clinic or urgent care.    For the latest updates on COVID-19 (Coronavirus), please visit the Centers for Disease Control and Prevention (CDC). Also, your state and local health department websites may provide additional guidance regarding testing and isolation recommendations for   your location.   Diagnosis: Acute bacterial sinusitis  Diagnosis ICD: J01.90    Follow up instructions:  ATTENTION: If you have been prescribed medications, your prescriptions will not be sent until you choose your pharmacy. To do so open the link within your notification, or go to [employer] and click eVisit in the menu to open your   treatment plan. From there, you can select your pharmacy at the bottom of your after visit summary. You can also go to [email or web address]   Prescriptions  Prescription: amoxicillin-pot clavulanate 875-125 mg oral tablet, take 1 tablet by mouth every 12 hours for 10 days  Sent To:  [facility] [id number] - [id number] - [address] [place] [address]  Prescription: fluconazole (Diflucan) 150 mg oral tablet, take 1 tablet by mouth in a single dose, repeat dose in 3 days if symptoms are still present  Sent To: [facility] [id number] - [id number] - [address] [place] [address]  Prescription: brompheniramine-pseudoeph-DM (Bromfed DM) 2-30-10 mg/5 mL oral syrup, take 10 milliliters by mouth every 4 hours as needed for cough  Sent To: [facility] [id number] - [id number] - [address] [place] [address]  Prescription: fluticasone 50 mcg/actuation nasal spray,suspension, inhale 2 sprays in each nostril 1 time per day; after 1 week, may adjust to 1 - 2 sprays in each nostril 1 time per day.  Sent To: [facility] [id number] - [phone] - [address] [place] [address]

## 2025-03-18 ENCOUNTER — TELEPHONE (OUTPATIENT)
Dept: INTERNAL MEDICINE | Facility: CLINIC | Age: 35
End: 2025-03-18
Payer: COMMERCIAL

## 2025-03-18 NOTE — TELEPHONE ENCOUNTER
Notified patient that she needed to schedule an appt in order to increase the dose on her depression medication. Patient verbalized understanding and scheduled an appt.

## 2025-03-28 ENCOUNTER — PATIENT MESSAGE (OUTPATIENT)
Dept: OBSTETRICS AND GYNECOLOGY | Facility: CLINIC | Age: 35
End: 2025-03-28
Payer: COMMERCIAL

## 2025-03-28 RX ORDER — FLUCONAZOLE 200 MG/1
200 TABLET ORAL DAILY
Qty: 2 TABLET | Refills: 0 | Status: SHIPPED | OUTPATIENT
Start: 2025-03-28 | End: 2025-03-30

## 2025-04-08 ENCOUNTER — TELEMEDICINE (OUTPATIENT)
Dept: FAMILY MEDICINE CLINIC | Facility: TELEHEALTH | Age: 35
End: 2025-04-08
Payer: COMMERCIAL

## 2025-04-08 VITALS — TEMPERATURE: 98.6 F

## 2025-04-08 DIAGNOSIS — J01.40 ACUTE PANSINUSITIS, RECURRENCE NOT SPECIFIED: Primary | ICD-10-CM

## 2025-04-08 RX ORDER — METHYLPREDNISOLONE 4 MG/1
TABLET ORAL
Qty: 21 TABLET | Refills: 0 | Status: SHIPPED | OUTPATIENT
Start: 2025-04-08

## 2025-04-08 RX ORDER — AZELASTINE 1 MG/ML
2 SPRAY, METERED NASAL 2 TIMES DAILY
Qty: 30 ML | Refills: 0 | Status: SHIPPED | OUTPATIENT
Start: 2025-04-08 | End: 2025-04-15

## 2025-04-08 NOTE — PROGRESS NOTES
Mode of Visit: Video  Location of patient: -HOME-  Location of provider: +HOME+  You have chosen to receive care through a telehealth visit.  The patient has signed the video visit consent form.  The visit included audio and video interaction. No technical issues occurred during this visit.    HPI  Arminda Llanos is a 34 y.o. female  presents with complaint of headache, sinus problem.  She reports that she has a terrible headache and suspects a sinus infection.  She reports nasal congestion and sinus pain and pressure.  She also reports a mild sore throat chills and fatigue.  She thinks the musclelike she is having may be from moving things over the weekend.  All symptoms she is having are noted in the ROS portion of this visit.  Her symptoms started yesterday.  She has tried Nasacort for her symptoms.  She has also done 2 COVID test which have been negative.     Review of Systems   Constitutional:  Positive for chills and fatigue. Negative for fever.   HENT:  Positive for congestion, postnasal drip, sinus pressure, sinus pain, sneezing and sore throat (mild).    Respiratory:  Negative for cough, chest tightness, shortness of breath and wheezing.    Gastrointestinal:  Positive for nausea. Negative for diarrhea.   Musculoskeletal:  Myalgias: mild may be from moving things over weekend.   Neurological:  Positive for headaches.       Past Medical History:   Diagnosis Date    Anxiety 2011    COVID-19 08/01/2022    Decreased body weight 02/13/2020    Depression     Headache     Herpes     Obesity 2013    Onychomycosis 09/14/2022    Urinary tract infection        Family History   Problem Relation Age of Onset    Breast cancer Mother 50        s/p double mastectomy    Depression Mother     Anxiety disorder Mother     Cancer Mother         breast cancer    Diabetes Father     Diabetes Maternal Grandmother     Diabetes Maternal Grandfather     Brain cancer Maternal Grandfather     Cancer Maternal Grandfather          brain cancer    Diabetes Paternal Grandmother     Diabetes Paternal Grandfather     Depression Sister     Anxiety disorder Sister     Liver disease Half-Brother         fatty liver    Colon cancer Neg Hx     Osteoporosis Neg Hx        Social History     Socioeconomic History    Marital status:    Tobacco Use    Smoking status: Former     Current packs/day: 0.00     Average packs/day: 0.5 packs/day for 7.0 years (3.5 ttl pk-yrs)     Types: Cigarettes     Start date: 2009     Quit date: 2016     Years since quittin.6     Passive exposure: Past    Smokeless tobacco: Never    Tobacco comments:      quit   Vaping Use    Vaping status: Never Used   Substance and Sexual Activity    Alcohol use: Never    Drug use: Not Currently     Types: Marijuana     Comment: previously smoked marijuana, no marijuana use x1 month    Sexual activity: Yes     Partners: Male     Birth control/protection: Coitus interruptus       Arminda Llanos  reports that she quit smoking about 8 years ago. Her smoking use included cigarettes. She started smoking about 15 years ago. She has a 3.5 pack-year smoking history. She has been exposed to tobacco smoke. She has never used smokeless tobacco.         Temp 98.6 °F (37 °C)   Breastfeeding No     PHYSICAL EXAM  Physical Exam   HENT:   Nose: Congestion present. Right sinus exhibits maxillary sinus tenderness (Patient direct exam) and frontal sinus tenderness (Patient directed exam). Left sinus exhibits maxillary sinus tenderness (Patient directed exam) and frontal sinus tenderness (Patient directed exam).       Results for orders placed or performed in visit on 24   LIQUID-BASED PAP SMEAR WITH HPV GENOTYPING REGARDLESS OF INTERPRETATION (TAE,COR,MAD)    Collection Time: 24  9:49 AM    Specimen: Cervix, Endocervix; ThinPrep Vial   Result Value Ref Range    Reference Lab Report       [facility]  [address] [place]  [address]  Phone:  [phone] or [phone]  Fax: [phone]  Domingo Oliver M.D., Medical Director    PATIENT NAME                           LABORATORY NO.  [facility]UMONT.                  [id number]  [id number]                         AGE              SEX  SSN           CLIENT REF #  [facility] CORRALESFELY ZAVALA          34      1990  F    [id number]   [id number]    [address] [facility] [address]    REQUESTING M.D.     ATTENDING [place] [address]                MONICA ZAVALA  DATE COLLECTED      DATE RECEIVED      DATE REPORTED  2024    ThinPrep Pap with [facility]    DIAGNOSIS:  Negative for intraepithelial lesion or malignancy    Multiple factors can influence accuracy of Pap tests; therefore, screening at  regular intervals is necessary for early cancer detection.    COMMENT:  FUNGAL ORGANISMS MORPHOLOGICALLY CONSISTENT  WITH CANDIDA SPECIES ARE  PRESENT.  Professional interpretation rendered by Domingo Oliver M.D., F.C.A.P. at  [facility], [address] [place] [address].  SPECIMEN ADEQUACY:  SATISFACTORY FOR EVALUATION  Transformation zone is present.  SOURCE OF SPECIMEN:  CERVICAL/ENDOCERVICAL  SLIDES:  1  CLINICAL HISTORY:  Women's annual routine gynecological examination  Vaginitis    HPV  HR-HPV POOL: Negative    The Aptima HPV assay is an in vitro nucleic acid amplification test for the  qualitative detection of E6/E7 viral messenger RNA from 14 high risk types of  HPV in cervical specimens. The high risk HPV types detected include: 16, 18,  31, 33, 35, 39, 45, 51, 52, 56, 58, 59, 66, 68    Trichomonas  TRICHOMONAS VAGINALIS: Negative    The Aptima Trichomonas vaginalis assay is an in vitro qualitative nucleic acid  amplification test for the detection of ribosomal RNA to aid in the diagnosis of  trichomoniasis.    Sendout Ancillary Vaginosis Tests  OLIVER GLABRATA:  Positive  OLIVER PARAPSILOSIS:  Negative  CANDIDA TROPICALIS: Negative  [facility] OLIVER ALBICANS: Negative    COMMENT: The above results are intended for medical diagnosis and treatment  purposes only.    Results from this assay (s) should be interpreted in conjunction with other  laboratory and clinical data. The test is not intended to differentiate carriers of  the organism from those with active infections. Therapeutic success or failure  cannot be assessed using this test because DNA may persist following  antimicrobial therapy. Nucleic acid base changes or mutations can result in false  negative reactions. This test was developed and its performance characteristics  determined by [facility] It has not been cleared  or approved by the U.S. Food and Drug Administration. The FDA has  determined that such clearance or approval is not necessary. [facility] ([facility]) is located in [place], [place]    CYTOTECHNOLOGIST:      KAZ MONIQUE (ASCP)                             REVIEWED, DIAGNOSED AND  ELECTRONICALLY SIGNED BY:      Domingo Oliver M.D., F.C.A.P.    CPT CODES:  81655, 09560, 07276, 69110, [id number]         Diagnoses and all orders for this visit:    1. Acute pansinusitis, recurrence not specified (Primary)    Other orders  -     methylPREDNISolone (MEDROL) 4 MG dose pack; Take as directed on package instructions.  Dispense: 21 tablet; Refill: 0  -     azelastine (ASTELIN) 0.1 % nasal spray; Administer 2 sprays into the nostril(s) as directed by provider 2 (Two) Times a Day for 7 days. Use in each nostril as directed  Dispense: 30 mL; Refill: 0    Take Medrol with food as early in the day as possible  Do not take Medrol with nsaids such as ibuprofen, aleve, or aspirin  May take tylenol for pain or fever  Discontinue Nasacort  Start azelastine nasal spray as directed  Hydrate well  Repeat COVID test advised    FOLLOW-UP  If symptoms worsen or persist follow up with PCP, [facility] or Urgent  Care    Patient verbalizes understanding of medication dosage, comfort measures, instructions for treatment and follow-up.    Fely CHRISTENSEN Courtney, APRN  04/08/2025  13:35 EDT    The use of a video visit has been reviewed with the patient and verbal informed consent has been obtained. Myself and Arminda Llanos participated in this visit. The patient is located in [address] [place] [address].    I am located in [place], [place]. [employer] and [employer] were utilized. I spent 21 minutes in the patient's chart for this visit.